# Patient Record
Sex: FEMALE | Race: WHITE | Employment: UNEMPLOYED | URBAN - METROPOLITAN AREA
[De-identification: names, ages, dates, MRNs, and addresses within clinical notes are randomized per-mention and may not be internally consistent; named-entity substitution may affect disease eponyms.]

---

## 2021-06-19 ENCOUNTER — APPOINTMENT (OUTPATIENT)
Dept: CT IMAGING | Age: 51
End: 2021-06-19
Payer: COMMERCIAL

## 2021-06-19 ENCOUNTER — HOSPITAL ENCOUNTER (EMERGENCY)
Age: 51
Discharge: ANOTHER HOSPITAL | End: 2021-06-19
Attending: EMERGENCY MEDICINE
Payer: COMMERCIAL

## 2021-06-19 ENCOUNTER — HOSPITAL ENCOUNTER (INPATIENT)
Age: 51
LOS: 6 days | Discharge: HOME | DRG: 392 | End: 2021-06-25
Attending: INTERNAL MEDICINE | Admitting: INTERNAL MEDICINE
Payer: COMMERCIAL

## 2021-06-19 VITALS
HEART RATE: 63 BPM | OXYGEN SATURATION: 99 % | SYSTOLIC BLOOD PRESSURE: 134 MMHG | HEIGHT: 61 IN | BODY MASS INDEX: 33.04 KG/M2 | RESPIRATION RATE: 16 BRPM | DIASTOLIC BLOOD PRESSURE: 60 MMHG | TEMPERATURE: 97.7 F | WEIGHT: 175 LBS

## 2021-06-19 DIAGNOSIS — K57.20 DIVERTICULITIS OF LARGE INTESTINE WITH PERFORATION AND ABSCESS WITHOUT BLEEDING: Primary | ICD-10-CM

## 2021-06-19 DIAGNOSIS — K57.92 DIVERTICULITIS: Primary | ICD-10-CM

## 2021-06-19 LAB
ALBUMIN SERPL-MCNC: 4.2 G/DL (ref 3.5–5.2)
ALP BLD-CCNC: 129 U/L (ref 35–104)
ALT SERPL-CCNC: 14 U/L (ref 0–32)
ANION GAP SERPL CALCULATED.3IONS-SCNC: 11 MMOL/L (ref 7–16)
AST SERPL-CCNC: 17 U/L (ref 0–31)
BACTERIA: ABNORMAL /HPF
BASOPHILS ABSOLUTE: 0.03 E9/L (ref 0–0.2)
BASOPHILS RELATIVE PERCENT: 0.4 % (ref 0–2)
BILIRUB SERPL-MCNC: 0.3 MG/DL (ref 0–1.2)
BILIRUBIN URINE: NEGATIVE
BLOOD, URINE: ABNORMAL
BUN BLDV-MCNC: 11 MG/DL (ref 6–20)
CALCIUM SERPL-MCNC: 9.5 MG/DL (ref 8.6–10.2)
CHLORIDE BLD-SCNC: 103 MMOL/L (ref 98–107)
CLARITY: CLEAR
CO2: 27 MMOL/L (ref 22–29)
COLOR: YELLOW
CREAT SERPL-MCNC: 0.9 MG/DL (ref 0.5–1)
EOSINOPHILS ABSOLUTE: 0.22 E9/L (ref 0.05–0.5)
EOSINOPHILS RELATIVE PERCENT: 2.9 % (ref 0–6)
GFR AFRICAN AMERICAN: >60
GFR NON-AFRICAN AMERICAN: >60 ML/MIN/1.73
GLUCOSE BLD-MCNC: 103 MG/DL (ref 74–99)
GLUCOSE URINE: NEGATIVE MG/DL
HCT VFR BLD CALC: 36.6 % (ref 34–48)
HEMOGLOBIN: 11.2 G/DL (ref 11.5–15.5)
IMMATURE GRANULOCYTES #: 0.03 E9/L
IMMATURE GRANULOCYTES %: 0.4 % (ref 0–5)
KETONES, URINE: NEGATIVE MG/DL
LACTIC ACID: 1.1 MMOL/L (ref 0.5–2.2)
LEUKOCYTE ESTERASE, URINE: NEGATIVE
LIPASE: 30 U/L (ref 13–60)
LYMPHOCYTES ABSOLUTE: 1.33 E9/L (ref 1.5–4)
LYMPHOCYTES RELATIVE PERCENT: 17.8 % (ref 20–42)
MCH RBC QN AUTO: 24.7 PG (ref 26–35)
MCHC RBC AUTO-ENTMCNC: 30.6 % (ref 32–34.5)
MCV RBC AUTO: 80.8 FL (ref 80–99.9)
MONOCYTES ABSOLUTE: 0.55 E9/L (ref 0.1–0.95)
MONOCYTES RELATIVE PERCENT: 7.4 % (ref 2–12)
NEUTROPHILS ABSOLUTE: 5.32 E9/L (ref 1.8–7.3)
NEUTROPHILS RELATIVE PERCENT: 71.1 % (ref 43–80)
NITRITE, URINE: NEGATIVE
PDW BLD-RTO: 16.4 FL (ref 11.5–15)
PH UA: 6 (ref 5–9)
PLATELET # BLD: 297 E9/L (ref 130–450)
PMV BLD AUTO: 10.3 FL (ref 7–12)
POTASSIUM REFLEX MAGNESIUM: 3.8 MMOL/L (ref 3.5–5)
PROTEIN UA: NEGATIVE MG/DL
RBC # BLD: 4.53 E12/L (ref 3.5–5.5)
RBC UA: ABNORMAL /HPF (ref 0–2)
SODIUM BLD-SCNC: 141 MMOL/L (ref 132–146)
SPECIFIC GRAVITY UA: 1.01 (ref 1–1.03)
TOTAL PROTEIN: 7.3 G/DL (ref 6.4–8.3)
TROPONIN, HIGH SENSITIVITY: 7 NG/L (ref 0–9)
UROBILINOGEN, URINE: 0.2 E.U./DL
WBC # BLD: 7.5 E9/L (ref 4.5–11.5)
WBC UA: ABNORMAL /HPF (ref 0–5)

## 2021-06-19 PROCEDURE — 6360000004 HC RX CONTRAST MEDICATION: Performed by: RADIOLOGY

## 2021-06-19 PROCEDURE — 36415 COLL VENOUS BLD VENIPUNCTURE: CPT

## 2021-06-19 PROCEDURE — 74177 CT ABD & PELVIS W/CONTRAST: CPT

## 2021-06-19 PROCEDURE — 83690 ASSAY OF LIPASE: CPT

## 2021-06-19 PROCEDURE — 2580000003 HC RX 258: Performed by: RADIOLOGY

## 2021-06-19 PROCEDURE — 96367 TX/PROPH/DG ADDL SEQ IV INF: CPT

## 2021-06-19 PROCEDURE — 1200000000 HC SEMI PRIVATE

## 2021-06-19 PROCEDURE — 87040 BLOOD CULTURE FOR BACTERIA: CPT

## 2021-06-19 PROCEDURE — 84484 ASSAY OF TROPONIN QUANT: CPT

## 2021-06-19 PROCEDURE — 83605 ASSAY OF LACTIC ACID: CPT

## 2021-06-19 PROCEDURE — 99223 1ST HOSP IP/OBS HIGH 75: CPT | Performed by: INTERNAL MEDICINE

## 2021-06-19 PROCEDURE — 6360000002 HC RX W HCPCS: Performed by: NURSE PRACTITIONER

## 2021-06-19 PROCEDURE — 93005 ELECTROCARDIOGRAM TRACING: CPT | Performed by: NURSE PRACTITIONER

## 2021-06-19 PROCEDURE — 6360000002 HC RX W HCPCS: Performed by: EMERGENCY MEDICINE

## 2021-06-19 PROCEDURE — 85025 COMPLETE CBC W/AUTO DIFF WBC: CPT

## 2021-06-19 PROCEDURE — 96376 TX/PRO/DX INJ SAME DRUG ADON: CPT

## 2021-06-19 PROCEDURE — 80053 COMPREHEN METABOLIC PANEL: CPT

## 2021-06-19 PROCEDURE — 96365 THER/PROPH/DIAG IV INF INIT: CPT

## 2021-06-19 PROCEDURE — 2500000003 HC RX 250 WO HCPCS: Performed by: NURSE PRACTITIONER

## 2021-06-19 PROCEDURE — 99284 EMERGENCY DEPT VISIT MOD MDM: CPT

## 2021-06-19 PROCEDURE — 96375 TX/PRO/DX INJ NEW DRUG ADDON: CPT

## 2021-06-19 PROCEDURE — 81001 URINALYSIS AUTO W/SCOPE: CPT

## 2021-06-19 PROCEDURE — 2580000003 HC RX 258: Performed by: NURSE PRACTITIONER

## 2021-06-19 RX ORDER — METOPROLOL SUCCINATE 25 MG/1
25 TABLET, EXTENDED RELEASE ORAL NIGHTLY
COMMUNITY

## 2021-06-19 RX ORDER — MORPHINE SULFATE 4 MG/ML
4 INJECTION, SOLUTION INTRAMUSCULAR; INTRAVENOUS ONCE
Status: COMPLETED | OUTPATIENT
Start: 2021-06-19 | End: 2021-06-19

## 2021-06-19 RX ORDER — 0.9 % SODIUM CHLORIDE 0.9 %
1000 INTRAVENOUS SOLUTION INTRAVENOUS ONCE
Status: COMPLETED | OUTPATIENT
Start: 2021-06-19 | End: 2021-06-19

## 2021-06-19 RX ORDER — CIPROFLOXACIN 2 MG/ML
400 INJECTION, SOLUTION INTRAVENOUS ONCE
Status: COMPLETED | OUTPATIENT
Start: 2021-06-19 | End: 2021-06-19

## 2021-06-19 RX ORDER — SODIUM CHLORIDE 0.9 % (FLUSH) 0.9 %
10 SYRINGE (ML) INJECTION PRN
Status: COMPLETED | OUTPATIENT
Start: 2021-06-19 | End: 2021-06-19

## 2021-06-19 RX ORDER — ONDANSETRON 2 MG/ML
4 INJECTION INTRAMUSCULAR; INTRAVENOUS ONCE
Status: COMPLETED | OUTPATIENT
Start: 2021-06-19 | End: 2021-06-19

## 2021-06-19 RX ORDER — ESOMEPRAZOLE MAGNESIUM 20 MG/1
20 FOR SUSPENSION ORAL DAILY PRN
COMMUNITY

## 2021-06-19 RX ADMIN — METRONIDAZOLE 500 MG: 500 INJECTION, SOLUTION INTRAVENOUS at 21:24

## 2021-06-19 RX ADMIN — MORPHINE SULFATE 4 MG: 4 INJECTION, SOLUTION INTRAMUSCULAR; INTRAVENOUS at 21:30

## 2021-06-19 RX ADMIN — ONDANSETRON 4 MG: 2 INJECTION INTRAMUSCULAR; INTRAVENOUS at 17:36

## 2021-06-19 RX ADMIN — IOPAMIDOL 75 ML: 755 INJECTION, SOLUTION INTRAVENOUS at 19:03

## 2021-06-19 RX ADMIN — SODIUM CHLORIDE 1000 ML: 9 INJECTION, SOLUTION INTRAVENOUS at 17:36

## 2021-06-19 RX ADMIN — MORPHINE SULFATE 4 MG: 4 INJECTION, SOLUTION INTRAMUSCULAR; INTRAVENOUS at 17:45

## 2021-06-19 RX ADMIN — SODIUM CHLORIDE, PRESERVATIVE FREE 10 ML: 5 INJECTION INTRAVENOUS at 19:03

## 2021-06-19 RX ADMIN — CIPROFLOXACIN 400 MG: 2 INJECTION, SOLUTION INTRAVENOUS at 20:08

## 2021-06-19 ASSESSMENT — PAIN DESCRIPTION - LOCATION
LOCATION: ABDOMEN
LOCATION: ABDOMEN

## 2021-06-19 ASSESSMENT — PAIN DESCRIPTION - PROGRESSION: CLINICAL_PROGRESSION: GRADUALLY WORSENING

## 2021-06-19 ASSESSMENT — PAIN SCALES - GENERAL
PAINLEVEL_OUTOF10: 10
PAINLEVEL_OUTOF10: 2
PAINLEVEL_OUTOF10: 10

## 2021-06-19 ASSESSMENT — PAIN DESCRIPTION - PAIN TYPE
TYPE: ACUTE PAIN
TYPE: ACUTE PAIN

## 2021-06-19 ASSESSMENT — PAIN DESCRIPTION - DESCRIPTORS
DESCRIPTORS: ACHING
DESCRIPTORS: ACHING;DULL

## 2021-06-19 ASSESSMENT — PAIN DESCRIPTION - ONSET: ONSET: ON-GOING

## 2021-06-19 ASSESSMENT — PAIN DESCRIPTION - FREQUENCY: FREQUENCY: CONTINUOUS

## 2021-06-19 NOTE — ED NOTES
Resting on cot without signs of distress. Denies pain at this time.       Henrry Montelongo RN  06/19/21 0144

## 2021-06-20 LAB
EKG ATRIAL RATE: 67 BPM
EKG P AXIS: 21 DEGREES
EKG P-R INTERVAL: 120 MS
EKG Q-T INTERVAL: 402 MS
EKG QRS DURATION: 86 MS
EKG QTC CALCULATION (BAZETT): 424 MS
EKG R AXIS: 19 DEGREES
EKG T AXIS: 61 DEGREES
EKG VENTRICULAR RATE: 67 BPM
FERRITIN: 21 NG/ML
IRON SATURATION: 8 % (ref 15–50)
IRON: 27 MCG/DL (ref 37–145)
TOTAL IRON BINDING CAPACITY: 336 MCG/DL (ref 250–450)

## 2021-06-20 PROCEDURE — 1200000000 HC SEMI PRIVATE

## 2021-06-20 PROCEDURE — 83540 ASSAY OF IRON: CPT

## 2021-06-20 PROCEDURE — 6360000002 HC RX W HCPCS: Performed by: INTERNAL MEDICINE

## 2021-06-20 PROCEDURE — 83550 IRON BINDING TEST: CPT

## 2021-06-20 PROCEDURE — 36415 COLL VENOUS BLD VENIPUNCTURE: CPT

## 2021-06-20 PROCEDURE — 6370000000 HC RX 637 (ALT 250 FOR IP): Performed by: INTERNAL MEDICINE

## 2021-06-20 PROCEDURE — 93010 ELECTROCARDIOGRAM REPORT: CPT | Performed by: INTERNAL MEDICINE

## 2021-06-20 PROCEDURE — 99233 SBSQ HOSP IP/OBS HIGH 50: CPT | Performed by: INTERNAL MEDICINE

## 2021-06-20 PROCEDURE — 2500000003 HC RX 250 WO HCPCS: Performed by: INTERNAL MEDICINE

## 2021-06-20 PROCEDURE — 2580000003 HC RX 258: Performed by: INTERNAL MEDICINE

## 2021-06-20 PROCEDURE — 82728 ASSAY OF FERRITIN: CPT

## 2021-06-20 RX ORDER — SODIUM CHLORIDE 9 MG/ML
25 INJECTION, SOLUTION INTRAVENOUS PRN
Status: DISCONTINUED | OUTPATIENT
Start: 2021-06-20 | End: 2021-06-25 | Stop reason: HOSPADM

## 2021-06-20 RX ORDER — LEVOTHYROXINE SODIUM 0.12 MG/1
125 TABLET ORAL DAILY
Status: DISCONTINUED | OUTPATIENT
Start: 2021-06-20 | End: 2021-06-25 | Stop reason: HOSPADM

## 2021-06-20 RX ORDER — ACETAMINOPHEN 650 MG/1
650 SUPPOSITORY RECTAL EVERY 6 HOURS PRN
Status: DISCONTINUED | OUTPATIENT
Start: 2021-06-20 | End: 2021-06-25 | Stop reason: HOSPADM

## 2021-06-20 RX ORDER — SODIUM CHLORIDE, SODIUM LACTATE, POTASSIUM CHLORIDE, CALCIUM CHLORIDE 600; 310; 30; 20 MG/100ML; MG/100ML; MG/100ML; MG/100ML
INJECTION, SOLUTION INTRAVENOUS CONTINUOUS
Status: DISCONTINUED | OUTPATIENT
Start: 2021-06-20 | End: 2021-06-21

## 2021-06-20 RX ORDER — SODIUM CHLORIDE 0.9 % (FLUSH) 0.9 %
10 SYRINGE (ML) INJECTION EVERY 12 HOURS SCHEDULED
Status: DISCONTINUED | OUTPATIENT
Start: 2021-06-20 | End: 2021-06-25 | Stop reason: HOSPADM

## 2021-06-20 RX ORDER — MORPHINE SULFATE 2 MG/ML
2 INJECTION, SOLUTION INTRAMUSCULAR; INTRAVENOUS EVERY 4 HOURS PRN
Status: DISCONTINUED | OUTPATIENT
Start: 2021-06-20 | End: 2021-06-22

## 2021-06-20 RX ORDER — PANTOPRAZOLE SODIUM 40 MG/1
40 TABLET, DELAYED RELEASE ORAL
Status: DISCONTINUED | OUTPATIENT
Start: 2021-06-20 | End: 2021-06-25 | Stop reason: HOSPADM

## 2021-06-20 RX ORDER — CIPROFLOXACIN 2 MG/ML
400 INJECTION, SOLUTION INTRAVENOUS EVERY 12 HOURS
Status: DISCONTINUED | OUTPATIENT
Start: 2021-06-20 | End: 2021-06-20

## 2021-06-20 RX ORDER — METOPROLOL SUCCINATE 25 MG/1
25 TABLET, EXTENDED RELEASE ORAL NIGHTLY
Status: DISCONTINUED | OUTPATIENT
Start: 2021-06-20 | End: 2021-06-25 | Stop reason: HOSPADM

## 2021-06-20 RX ORDER — ESOMEPRAZOLE MAGNESIUM 20 MG/1
20 FOR SUSPENSION ORAL DAILY
Status: DISCONTINUED | OUTPATIENT
Start: 2021-06-20 | End: 2021-06-20 | Stop reason: CLARIF

## 2021-06-20 RX ORDER — ACETAMINOPHEN 325 MG/1
650 TABLET ORAL EVERY 6 HOURS PRN
Status: DISCONTINUED | OUTPATIENT
Start: 2021-06-20 | End: 2021-06-25 | Stop reason: HOSPADM

## 2021-06-20 RX ORDER — SODIUM CHLORIDE 0.9 % (FLUSH) 0.9 %
10 SYRINGE (ML) INJECTION PRN
Status: DISCONTINUED | OUTPATIENT
Start: 2021-06-20 | End: 2021-06-25 | Stop reason: HOSPADM

## 2021-06-20 RX ORDER — POLYETHYLENE GLYCOL 3350 17 G/17G
17 POWDER, FOR SOLUTION ORAL DAILY PRN
Status: DISCONTINUED | OUTPATIENT
Start: 2021-06-20 | End: 2021-06-25 | Stop reason: HOSPADM

## 2021-06-20 RX ORDER — LEVOFLOXACIN 5 MG/ML
750 INJECTION, SOLUTION INTRAVENOUS EVERY 24 HOURS
Status: DISCONTINUED | OUTPATIENT
Start: 2021-06-20 | End: 2021-06-25 | Stop reason: HOSPADM

## 2021-06-20 RX ADMIN — PANTOPRAZOLE SODIUM 40 MG: 40 TABLET, DELAYED RELEASE ORAL at 07:24

## 2021-06-20 RX ADMIN — METRONIDAZOLE 500 MG: 500 INJECTION, SOLUTION INTRAVENOUS at 08:37

## 2021-06-20 RX ADMIN — MORPHINE SULFATE 2 MG: 2 INJECTION, SOLUTION INTRAMUSCULAR; INTRAVENOUS at 23:13

## 2021-06-20 RX ADMIN — LEVOFLOXACIN 750 MG: 5 INJECTION, SOLUTION INTRAVENOUS at 10:19

## 2021-06-20 RX ADMIN — SODIUM CHLORIDE, POTASSIUM CHLORIDE, SODIUM LACTATE AND CALCIUM CHLORIDE 100 ML/HR: 600; 310; 30; 20 INJECTION, SOLUTION INTRAVENOUS at 07:27

## 2021-06-20 RX ADMIN — ACETAMINOPHEN 650 MG: 325 TABLET ORAL at 08:47

## 2021-06-20 RX ADMIN — SODIUM CHLORIDE, PRESERVATIVE FREE 10 ML: 5 INJECTION INTRAVENOUS at 07:27

## 2021-06-20 RX ADMIN — METRONIDAZOLE 500 MG: 500 INJECTION, SOLUTION INTRAVENOUS at 15:56

## 2021-06-20 RX ADMIN — LEVOTHYROXINE SODIUM 125 MCG: 125 TABLET ORAL at 07:24

## 2021-06-20 RX ADMIN — MORPHINE SULFATE 2 MG: 2 INJECTION, SOLUTION INTRAMUSCULAR; INTRAVENOUS at 18:50

## 2021-06-20 RX ADMIN — METRONIDAZOLE 500 MG: 500 INJECTION, SOLUTION INTRAVENOUS at 23:13

## 2021-06-20 RX ADMIN — MORPHINE SULFATE 2 MG: 2 INJECTION, SOLUTION INTRAMUSCULAR; INTRAVENOUS at 08:38

## 2021-06-20 RX ADMIN — METOPROLOL SUCCINATE 25 MG: 25 TABLET, EXTENDED RELEASE ORAL at 21:20

## 2021-06-20 RX ADMIN — METOPROLOL SUCCINATE 25 MG: 25 TABLET, EXTENDED RELEASE ORAL at 00:38

## 2021-06-20 ASSESSMENT — PAIN DESCRIPTION - PAIN TYPE
TYPE: ACUTE PAIN

## 2021-06-20 ASSESSMENT — PAIN DESCRIPTION - ORIENTATION: ORIENTATION: LEFT

## 2021-06-20 ASSESSMENT — PAIN SCALES - GENERAL
PAINLEVEL_OUTOF10: 7
PAINLEVEL_OUTOF10: 7
PAINLEVEL_OUTOF10: 4
PAINLEVEL_OUTOF10: 2
PAINLEVEL_OUTOF10: 7

## 2021-06-20 ASSESSMENT — PAIN - FUNCTIONAL ASSESSMENT
PAIN_FUNCTIONAL_ASSESSMENT: ACTIVITIES ARE NOT PREVENTED

## 2021-06-20 ASSESSMENT — PAIN DESCRIPTION - LOCATION
LOCATION: HEAD
LOCATION: ABDOMEN
LOCATION: ABDOMEN

## 2021-06-20 ASSESSMENT — PAIN DESCRIPTION - PROGRESSION
CLINICAL_PROGRESSION: NOT CHANGED

## 2021-06-20 ASSESSMENT — PAIN DESCRIPTION - DESCRIPTORS
DESCRIPTORS: CONSTANT;ACHING;DISCOMFORT
DESCRIPTORS: CONSTANT;ACHING;DISCOMFORT
DESCRIPTORS: HEADACHE

## 2021-06-20 ASSESSMENT — PAIN DESCRIPTION - FREQUENCY
FREQUENCY: CONTINUOUS

## 2021-06-20 ASSESSMENT — PAIN DESCRIPTION - ONSET
ONSET: ON-GOING

## 2021-06-20 NOTE — PROGRESS NOTES
[facility] CARE AT [facility] Hospitalist   Progress Note    Admitting Date and Time: 6/19/2021 10:37 PM  Admit Dx: Acute diverticulitis [K57.92]    Subjective:    Patient was admitted with Acute diverticulitis [K57.92]. Patient denies fever, chills, cp, sob, n/v.     levothyroxine  125 mcg Oral Daily    metoprolol succinate  25 mg Oral Nightly    pantoprazole  40 mg Oral QAM AC    metroNIDAZOLE  500 mg Intravenous Q8H    sodium chloride flush  10 mL Intravenous 2 times per day    levofloxacin  750 mg Intravenous Q24H     sodium chloride flush, 10 mL, PRN  sodium chloride, 25 mL, PRN  polyethylene glycol, 17 g, Daily PRN  acetaminophen, 650 mg, Q6H PRN   Or  acetaminophen, 650 mg, Q6H PRN  morphine, 2 mg, Q4H PRN         Objective:          PHYSICAL EXAM:    Vitals:  /69   Pulse 63   Temp 98.4 °F (36.9 °C) (Oral)   Resp 16   Ht 5' 1\" (1.549 m)   Wt 178 lb 14.4 oz (81.1 kg)   LMP 02/06/2015   SpO2 94%   BMI 33.80 kg/m²     General:  Appears comfortable. Answers questions appropriately and cooperative with exam  HEENT:  Mucous membranes moist. No erythema, rhinorrhea, or post-nasal drip noted. Neck:  No carotid bruits. Heart:  Rhythm regular at rate of 64  Lungs:  CTA. No wheeze, rales, or rhonchi  Abdomen:  Positive bowel sounds positive. Soft. Non-tender. No guarding, rebound or rigidity. Breast/Rectal/Genitourinary: not pertinent. Extremities:  Negative for lower extremity edema  Skin:  Warm and dry  Vascular: 2/4 Dorsalis Pedis pulses bilaterally. Neuro:  Cranial nerves 2-12 grossly intact, no focal weakness or change in sensation noted. Extraocular muscles intact. Pupils equal, round, reactive to light.             Recent Labs     06/19/21  1728      K 3.8      CO2 27   BUN 11   CREATININE 0.9   GLUCOSE 103*   CALCIUM 9.5       Recent Labs     06/19/21  1728   WBC 7.5   RBC 4.53   HGB 11.2*   HCT 36.6   MCV 80.8   MCH 24.7*   MCHC 30.6*   RDW 16.4*      MPV 10.3 Radiology:   IR Interventional Radiology Procedure Request    (Results Pending)       Assessment:    Active Problems:    Acute diverticulitis  Resolved Problems:    * No resolved hospital problems. *      Plan:  1. Diverticulitis with perforation and abscess  Surgery following  continue abx  pt for IR to drain abscess  2.  Hypothyroidism continue med  3. htn continue med  4. gerd continue med    Chart reviewed and updated by nursing    Time spent is 35 min    Electronically signed by Freddie Siegel DO on 6/20/2021 at 6:48 PM

## 2021-06-20 NOTE — ED PROVIDER NOTES
ED Attending  CC: No       [address] [facility]  Department of Emergency Medicine   ED  Encounter Note  Admit Date/RoomTime: 2021  4:22 PM  ED Room:     NAME: Chris Lay  : 1970  MRN: [id number]     Chief Complaint:  Abdominal Pain (severe pain-started on thursday and progressively worse)    History of Present Illness        Chris Lay is a 48 y.o. old female who presents to the emergency department by private vehicle for sudden onset aching, cramping, sharp pain in the LLQ without radiation which began several week(s) prior to arrival.   There has been no similar episodes in the past .  Since onset the symptoms have been intermittent. The pain is associated with nausea. The pain is aggravated by certain movements and relieved by nothing. There has been NO back pain, chills, cloudy urine or constipation. .  ROS   Pertinent positives and negatives are stated within HPI, all other systems reviewed and are negative. Past Medical History:  has a past medical history of GERD (gastroesophageal reflux disease) and Thyroid disease. Surgical History has a past surgical history that includes Breast surgery and  section. Social History:  reports that she has never smoked. She has never used smokeless tobacco. She reports current alcohol use. She reports that she does not use drugs. Family History: family history is not on file. Allergies: Pcn [penicillins]    Physical Exam   Oxygen Saturation Interpretation: Normal.        ED Triage Vitals   BP Temp Temp Source Pulse Resp SpO2 Height Weight   21 1620 21 1620 21 1620 21 1620 21 1620 21 1620 21 1628 21 1628   125/83 97.7 °F (36.5 °C) Temporal 65 16 97 % 5' 1\" (1.549 m) 175 lb (79.4 kg)         General Appearance/Constitutional:  Alert, development consistent with age  [de-identified]:  NC/NT. PERRLA. Airway patent. Neck:  Supple. No lymphadenopathy.   Respiratory: 0.4 0.0 - 5.0 %    Lymphocytes % 17.8 (L) 20.0 - 42.0 %    Monocytes % 7.4 2.0 - 12.0 %    Eosinophils % 2.9 0.0 - 6.0 %    Basophils % 0.4 0.0 - 2.0 %    Neutrophils Absolute 5.32 1.80 - 7.30 E9/L    Immature Granulocytes # 0.03 E9/L    Lymphocytes Absolute 1.33 (L) 1.50 - 4.00 E9/L    Monocytes Absolute 0.55 0.10 - 0.95 E9/L    Eosinophils Absolute 0.22 0.05 - 0.50 E9/L    Basophils Absolute 0.03 0.00 - 0.20 E9/L   Comprehensive Metabolic Panel w/ Reflex to MG   Result Value Ref Range    Sodium 141 132 - 146 mmol/L    Potassium reflex Magnesium 3.8 3.5 - 5.0 mmol/L    Chloride 103 98 - 107 mmol/L    CO2 27 22 - 29 mmol/L    Anion Gap 11 7 - 16 mmol/L    Glucose 103 (H) 74 - 99 mg/dL    BUN 11 6 - 20 mg/dL    CREATININE 0.9 0.5 - 1.0 mg/dL    GFR Non-African American >60 >=60 mL/min/1.73    GFR African American >60     Calcium 9.5 8.6 - 10.2 mg/dL    Total Protein 7.3 6.4 - 8.3 g/dL    Albumin 4.2 3.5 - 5.2 g/dL    Total Bilirubin 0.3 0.0 - 1.2 mg/dL    Alkaline Phosphatase 129 (H) 35 - 104 U/L    ALT 14 0 - 32 U/L    AST 17 0 - 31 U/L   Lactic Acid, Plasma   Result Value Ref Range    Lactic Acid 1.1 0.5 - 2.2 mmol/L   Lipase   Result Value Ref Range    Lipase 30 13 - 60 U/L   Troponin   Result Value Ref Range    Troponin, High Sensitivity 7 0 - 9 ng/L   EKG 12 Lead   Result Value Ref Range    Ventricular Rate 67 BPM    Atrial Rate 67 BPM    P-R Interval 120 ms    QRS Duration 86 ms    Q-T Interval 402 ms    QTc Calculation (Bazett) 424 ms    P Axis 21 degrees    R Axis 19 degrees    T Axis 61 degrees     Imaging: All Radiology results interpreted by Radiologist unless otherwise noted. CT ABDOMEN PELVIS W IV CONTRAST Additional Contrast? None   Final Result   1. Diverticulitis involving left colon with adjacent loculated fluid and gas   collection suggesting contained perforation or diverticular abscess. 2.  No free air or free fluid.       3.  Cystic lesion associated with the left ovary measures 2.8 x 3.2 cm.   Ultrasound follow-up recommended for further evaluation. 4.  Multiple nonobstructing bilateral renal calculi. 5.  Hypoattenuating lesions associated with the liver likely represent cysts. Ultrasound follow-up could be helpful for further evaluation. RECOMMENDATIONS:   Managing Incidental Adnexal Cystic Mass by CT or MR      Benign cyst:      Premenopausal (< or equal to 50 years if LMP unknown)      < or equal to 5 cm: no follow up      >5 cm: US in 6-12 weeks      > or equal to 10 cm: US promptly      Early postmenopausal (0-5 years after LMP)      < or equal to 3 cm: no follow up      3-5 cm: US in 6-12 weeks      >5 cm: US promptly      Late postmenopausal      < or equal to 3 cm: no follow up      >3 cm: US promptly      Probably benign cyst : {benign appearing except demonstrates one or more of   the following - (a) angulated margin, (b) not round/oval shape, (c) not well   imaged due to artifact or technical parameters (ex. noncontrast CT)      Premenopausal (< than or equal to 50 years if LMP unknown)      < or equal to 3 cm: no follow up      3-5 cm: US in 6-12 weeks      >5 cm: US      Early postmenopausal      < or equal to 3 cm: no follow up      >3 cm: US promptly      Late postmenopausal      < or equal to 1 cm: no follow up      >1 cm: US      Reference:      Teressa Gutierrez et al. Managing Incidental Findings on Abdominal CT: White Paper of   the ACR Incidental Findings Committee.  J Am Parish Radiol 2010;7:754-773           EKG Interpretation    Interpreted by emergency department physician    Rhythm: normal sinus   Rate: normal  Axis: normal  Ectopy: none  Conduction: normal  ST Segments: no acute change  T Waves: no acute change  Q Waves: none    Clinical Impression: no acute changes    MIRANDA Bustamante - CNP    ED Course / Medical Decision Making     Medications   metronidazole (FLAGYL) 500 mg in NaCl 100 mL IVPB premix (has no administration in time range)   ciprofloxacin (CIPRO) IVPB 400 mg (400 mg Intravenous New Bag 6/19/21 2008)   0.9 % sodium chloride bolus (0 mLs Intravenous Stopped 6/19/21 1854)   ondansetron (ZOFRAN) injection 4 mg (4 mg Intravenous Given 6/19/21 1736)   morphine sulfate (PF) injection 4 mg (4 mg Intravenous Given 6/19/21 1745)   iopamidol (ISOVUE-370) 76 % injection 75 mL (75 mLs Intravenous Given 6/19/21 1903)   sodium chloride flush 0.9 % injection 10 mL (10 mLs Intravenous Given 6/19/21 1903)     ED Course as of Jun 19 2023   Sat Jun 19, 8177   3580 Dr Ash Holley, radiologist -patient has diverticulitis with abscess vs contained perferation  Also an atypical left ovarian cyst - ultrasound. [MF]      ED Course User Index  [MF] Ranae Landau, DO     Re-Evaluations:  6/19/21      Time: 5216  Patients condition is improving. Consultations:             IP CONSULT TO HOSPITALIST   [id number] spoke with . will accept the patient to [facility]  Procedures:   none    MDM:  The patient was treated with iv fluids ,pain medication and iv antibiotics she is agreeable for inpatient admission for her diverticulitis and possible abscess. Patient was seen and evaluated by dr. Tico Pandya and Marilia nowak is accepting    Plan of Care/Counseling:  EM Attending Physician and I reviewed today's visit with the patient and spouse / life partner in addition to providing specific details for the plan of care and counseling regarding the diagnosis and prognosis. Questions are answered at this time and are agreeable with the plan. Assessment      1. Diverticulitis      This patient's ED course included: a personal history and physicial examination  This patient has improved during their ED course. Plan   [facility]patient Ad[facility] with telemetry  Patient condition is stable.     New Medications     New Prescriptions    No medications on file     Electronically signed by MIRANDA Abrams CNP   DD: 6/19/21  **This report was transcribed using voice recognition software. Every effort was made to ensure accuracy; however, inadvertent computerized transcription errors may be present.   Anya 79, APRN - CNP  06/19/21 2023

## 2021-06-20 NOTE — CONSULTS
Consultation    Patient's Name/Date of Birth: Rodger Blanchard / 1970    Date: 2021     PCP: Jenny Pike DO     Reason for consult:    Acute diverticulitis with perforation small abscess      History of Present Illness: The patient is a 72-year-old white female who presents with a perforated but contained diverticular abscess. The patient states that she developed pain in the left lower quadrant on Thursday. She states that the pain has worsened. She denies any other significant associated symptomatology including fevers and chills. She states that the pain is focused to the left side of the abdomen. The patient denies any other significant associated symptomatology.       Past Medical History:   Diagnosis Date    GERD (gastroesophageal reflux disease)     Hypertension     Thyroid disease       Past Surgical History:   Procedure Laterality Date    BREAST SURGERY       SECTION        Allergies: Pcn [penicillins]     Current Facility-Administered Medications   Medication Dose Route Frequency Provider Last Rate Last Admin    levothyroxine (SYNTHROID) tablet 125 mcg  125 mcg Oral Daily Farnaz Guillaume MD   125 mcg at 21 0724    metoprolol succinate (TOPROL XL) extended release tablet 25 mg  25 mg Oral Nightly Farnaz Guillaume MD   25 mg at 21 0038    pantoprazole (PROTONIX) tablet 40 mg  40 mg Oral QAM AC Farnaz Guillaume MD   40 mg at 21 0724    metronidazole (FLAGYL) 500 mg in NaCl 100 mL IVPB premix  500 mg Intravenous Ángela Patterson MD   Stopped at 21 0937    sodium chloride flush 0.9 % injection 10 mL  10 mL Intravenous 2 times per day Bruna Milner MD        sodium chloride flush 0.9 % injection 10 mL  10 mL Intravenous PRN Farnaz Guillaume MD   10 mL at 21 0727    0.9 % sodium chloride infusion  25 mL Intravenous PRN Farnaz Guillaume MD        polyethylene glycol (GLYCOLAX) packet 17 g  17 g Oral Daily PRN Bruna Milner MD       Floibis Ada acetaminophen (TYLENOL) tablet 650 mg  650 mg Oral Q6H PRN Ayaan Wood MD   650 mg at 06/20/21 0847    Or    acetaminophen (TYLENOL) suppository 650 mg  650 mg Rectal Q6H PRN Ayaan Wood MD        lactated ringers infusion   Intravenous Continuous Farnaz Guillaume  mL/hr at 06/20/21 0727 100 mL/hr at 06/20/21 0727    morphine (PF) injection 2 mg  2 mg Intravenous Q4H PRN Farnaz Guillaume MD   2 mg at 06/20/21 0838    levoFLOXacin (LEVAQUIN) 750 MG/150ML infusion 750 mg  750 mg Intravenous Q24H Jose Hric,  mL/hr at 06/20/21 1019 750 mg at 06/20/21 1019       Social History     Tobacco Use    Smoking status: Never Smoker    Smokeless tobacco: Never Used   Substance Use Topics    Alcohol use: Yes        Labs:  Lab Results   Component Value Date    WBC 7.5 06/19/2021    HCT 36.6 06/19/2021    HGB 11.2 (L) 06/19/2021     06/19/2021      Lab Results   Component Value Date     06/19/2021    K 3.8 06/19/2021     06/19/2021    CO2 27 06/19/2021    BUN 11 06/19/2021    CREATININE 0.9 06/19/2021    GLUCOSE 103 (H) 06/19/2021     Lab Results   Component Value Date    AST 17 06/19/2021    ALT 14 06/19/2021    ALKPHOS 129 (H) 06/19/2021    PROT 7.3 06/19/2021    LIPASE 30 06/19/2021        Review of Systems:    Other than stated above in the HPI is negative      Physical exam: /69   Pulse 63   Temp 98.4 °F (36.9 °C) (Oral)   Resp 16   Ht 5' 1\" (1.549 m)   LMP 02/06/2015   SpO2 94%   BMI 33.07 kg/m²     General appearance: no acute distress  Head: NCAT, PERRLA, EOMI  Neck: supple, no masses  Lungs: CTABL  Heart: RRR  Abdomen: soft, nondistended, tender, normotympanic, no guarding, no peritoneal signs.   Extremities: no rash cyanosis edema or jaundice    Assessment:    Perforated diverticular disease with small abscess    Plan:    Interventional radiology to drain the abscess tomorrow  IV antibiotics  Karolina Ashton MD,MD 6/20/2021 at 11:06 AM

## 2021-06-20 NOTE — H&P
[facility] History and Physical      CHIEF COMPLAINT:  abd pain    History of Present Illness: 80-year-old female with a history of essential hypertension and hypothyroidism. Also told she has spasm of her gallbladder. On Thursday night she developed pain in the left lower quadrant of her abdomen that she states was sharp. Since then has worsened and radiated to her back. Associated with nausea. Had one episode of vomiting. Due to her nausea she has not been able to eat much but is tolerating clear liquid. No constipation or diarrhea. Went to an outside hospital.  Found to have diverticulitis with microperforation and a 2.5 cm abscess. Given antibiotics and referred to this hospital.  No prior episodes of diverticulitis. No prior colonoscopy. Mother with colon cancer diagnosed in her 76s. When seen she had received analgesics with improvement in her pain. Informant(s) for H&P:patient    REVIEW OF SYSTEMS:  A comprehensive 14 point review of systems was negative except for: what is in the HPI    PMH:  Past Medical History:   Diagnosis Date    GERD (gastroesophageal reflux disease)     Hypertension     Thyroid disease        Surgical History:  Past Surgical History:   Procedure Laterality Date    BREAST SURGERY       SECTION         Medications Prior to Admission:    Prior to Admission medications    Medication Sig Start Date End Date Taking? Authorizing Provider   metoprolol succinate (TOPROL XL) 25 MG extended release tablet Take 25 mg by mouth nightly    Yes Historical Provider, MD   esomeprazole Magnesium (NEXIUM) 20 MG PACK Take 20 mg by mouth daily as needed   Yes Historical Provider, MD   levothyroxine (SYNTHROID) 100 MCG tablet Take 125 mcg by mouth Daily    Yes Historical Provider, MD   Cetirizine HCl (ZYRTEC PO) Take 1 tablet by mouth daily as needed.    Yes Historical Provider, MD       Allergies:    Pcn [penicillins]    Social History:    reports that she has never smoked. She has never used smokeless tobacco. She reports current alcohol use. She reports that she does not use drugs. Family History:   Mom colon ca      PHYSICAL EXAM:  Vitals:  BP (!) 156/78   Pulse 64   Temp 97.9 °F (36.6 °C) (Oral)   Resp 16   LMP 02/06/2015   SpO2 97%   General Appearance: alert and oriented to person, place and time and in no acute distress  Skin: warm and dry, turgor not diminished  Head: normocephalic and atraumatic  Eyes: pupils equal, round, and reactive to light, extraocular eye movements intact, conjunctivae normal  Neck: neck supple and non tender without mass   Pulmonary/Chest: clear to auscultation bilaterally- no wheezes, rales or rhonchi, normal air movement, no respiratory distress  Cardiovascular: normal rate, normal S1 and S2 and no M/R/R  Abdomen: soft, LLQ ttp  Extremities: no cyanosis, no clubbing and no edema  Neurologic: no cranial nerve deficit and speech normal        LABS:  Recent Labs     06/19/21  1728      K 3.8      CO2 27   BUN 11   CREATININE 0.9   GLUCOSE 103*   CALCIUM 9.5       Recent Labs     06/19/21  1728   WBC 7.5   RBC 4.53   HGB 11.2*   HCT 36.6   MCV 80.8   MCH 24.7*   MCHC 30.6*   RDW 16.4*      MPV 10.3       No results for input(s): POCGLU in the last 72 hours. Radiology:   No orders to display       EKG: No acute moderate    ASSESSMENT:    Acute diverticulitis complicated by microperforation and abscess  Essential hypertension  Hypothyroidism  Microcytic anemia  Ovarian cyst  Nonobstructing nephrolithiasis    PLAN:  Acute diverticulitis complicated by microperforation and abscess:  -Cipro and Flagyl  N. p.o., except sips of liquid  Consult surgery  Blood culture pending    Essential hypertension: Resume Toprol    Hypothyroidism: resume synthroid    Code Status: full  DVT prophylaxis: heparin      NOTE: This report was transcribed using voice recognition software.  Every effort was made to ensure accuracy; however, inadvertent computerized transcription errors may be present.   Electronically signed by Faheem Chavira MD on 6/19/2021 at 11:49 PM

## 2021-06-21 LAB
ANION GAP SERPL CALCULATED.3IONS-SCNC: 8 MMOL/L (ref 7–16)
APTT: 30 SEC (ref 24.5–35.1)
BASOPHILS ABSOLUTE: 0.03 E9/L (ref 0–0.2)
BASOPHILS RELATIVE PERCENT: 0.5 % (ref 0–2)
BUN BLDV-MCNC: 10 MG/DL (ref 6–20)
CALCIUM SERPL-MCNC: 8.9 MG/DL (ref 8.6–10.2)
CHLORIDE BLD-SCNC: 107 MMOL/L (ref 98–107)
CO2: 28 MMOL/L (ref 22–29)
CREAT SERPL-MCNC: 0.8 MG/DL (ref 0.5–1)
EOSINOPHILS ABSOLUTE: 0.23 E9/L (ref 0.05–0.5)
EOSINOPHILS RELATIVE PERCENT: 4.1 % (ref 0–6)
GFR AFRICAN AMERICAN: >60
GFR NON-AFRICAN AMERICAN: >60 ML/MIN/1.73
GLUCOSE BLD-MCNC: 101 MG/DL (ref 74–99)
HCT VFR BLD CALC: 32.1 % (ref 34–48)
HEMOGLOBIN: 9.8 G/DL (ref 11.5–15.5)
IMMATURE GRANULOCYTES #: 0.01 E9/L
IMMATURE GRANULOCYTES %: 0.2 % (ref 0–5)
INR BLD: 1.4
LYMPHOCYTES ABSOLUTE: 1.08 E9/L (ref 1.5–4)
LYMPHOCYTES RELATIVE PERCENT: 19.3 % (ref 20–42)
MCH RBC QN AUTO: 25.1 PG (ref 26–35)
MCHC RBC AUTO-ENTMCNC: 30.5 % (ref 32–34.5)
MCV RBC AUTO: 82.1 FL (ref 80–99.9)
MONOCYTES ABSOLUTE: 0.46 E9/L (ref 0.1–0.95)
MONOCYTES RELATIVE PERCENT: 8.2 % (ref 2–12)
NEUTROPHILS ABSOLUTE: 3.78 E9/L (ref 1.8–7.3)
NEUTROPHILS RELATIVE PERCENT: 67.7 % (ref 43–80)
PDW BLD-RTO: 16.1 FL (ref 11.5–15)
PLATELET # BLD: 221 E9/L (ref 130–450)
PMV BLD AUTO: 9.7 FL (ref 7–12)
POTASSIUM REFLEX MAGNESIUM: 4.1 MMOL/L (ref 3.5–5)
PROTHROMBIN TIME: 15.4 SEC (ref 9.3–12.4)
RBC # BLD: 3.91 E12/L (ref 3.5–5.5)
SODIUM BLD-SCNC: 143 MMOL/L (ref 132–146)
WBC # BLD: 5.6 E9/L (ref 4.5–11.5)

## 2021-06-21 PROCEDURE — 85610 PROTHROMBIN TIME: CPT

## 2021-06-21 PROCEDURE — 1200000000 HC SEMI PRIVATE

## 2021-06-21 PROCEDURE — 85730 THROMBOPLASTIN TIME PARTIAL: CPT

## 2021-06-21 PROCEDURE — 2580000003 HC RX 258: Performed by: INTERNAL MEDICINE

## 2021-06-21 PROCEDURE — 6370000000 HC RX 637 (ALT 250 FOR IP): Performed by: INTERNAL MEDICINE

## 2021-06-21 PROCEDURE — 85025 COMPLETE CBC W/AUTO DIFF WBC: CPT

## 2021-06-21 PROCEDURE — 36415 COLL VENOUS BLD VENIPUNCTURE: CPT

## 2021-06-21 PROCEDURE — 80048 BASIC METABOLIC PNL TOTAL CA: CPT

## 2021-06-21 PROCEDURE — 6360000002 HC RX W HCPCS: Performed by: INTERNAL MEDICINE

## 2021-06-21 PROCEDURE — 99232 SBSQ HOSP IP/OBS MODERATE 35: CPT | Performed by: INTERNAL MEDICINE

## 2021-06-21 PROCEDURE — 2500000003 HC RX 250 WO HCPCS: Performed by: INTERNAL MEDICINE

## 2021-06-21 RX ADMIN — MORPHINE SULFATE 2 MG: 2 INJECTION, SOLUTION INTRAMUSCULAR; INTRAVENOUS at 04:26

## 2021-06-21 RX ADMIN — MORPHINE SULFATE 2 MG: 2 INJECTION, SOLUTION INTRAMUSCULAR; INTRAVENOUS at 08:55

## 2021-06-21 RX ADMIN — ACETAMINOPHEN 650 MG: 325 TABLET ORAL at 08:55

## 2021-06-21 RX ADMIN — Medication 10 ML: at 21:44

## 2021-06-21 RX ADMIN — METRONIDAZOLE 500 MG: 500 INJECTION, SOLUTION INTRAVENOUS at 15:37

## 2021-06-21 RX ADMIN — PANTOPRAZOLE SODIUM 40 MG: 40 TABLET, DELAYED RELEASE ORAL at 10:15

## 2021-06-21 RX ADMIN — LEVOTHYROXINE SODIUM 125 MCG: 125 TABLET ORAL at 10:14

## 2021-06-21 RX ADMIN — MORPHINE SULFATE 2 MG: 2 INJECTION, SOLUTION INTRAMUSCULAR; INTRAVENOUS at 21:43

## 2021-06-21 RX ADMIN — METRONIDAZOLE 500 MG: 500 INJECTION, SOLUTION INTRAVENOUS at 23:30

## 2021-06-21 RX ADMIN — METOPROLOL SUCCINATE 25 MG: 25 TABLET, EXTENDED RELEASE ORAL at 21:42

## 2021-06-21 RX ADMIN — LEVOFLOXACIN 750 MG: 5 INJECTION, SOLUTION INTRAVENOUS at 08:59

## 2021-06-21 RX ADMIN — METRONIDAZOLE 500 MG: 500 INJECTION, SOLUTION INTRAVENOUS at 06:45

## 2021-06-21 ASSESSMENT — PAIN SCALES - GENERAL
PAINLEVEL_OUTOF10: 7
PAINLEVEL_OUTOF10: 6
PAINLEVEL_OUTOF10: 6
PAINLEVEL_OUTOF10: 7

## 2021-06-21 ASSESSMENT — PAIN DESCRIPTION - PROGRESSION: CLINICAL_PROGRESSION: NOT CHANGED

## 2021-06-21 ASSESSMENT — PAIN DESCRIPTION - PAIN TYPE: TYPE: ACUTE PAIN

## 2021-06-21 ASSESSMENT — PAIN DESCRIPTION - DESCRIPTORS: DESCRIPTORS: CONSTANT;ACHING

## 2021-06-21 ASSESSMENT — PAIN DESCRIPTION - FREQUENCY: FREQUENCY: CONTINUOUS

## 2021-06-21 ASSESSMENT — PAIN - FUNCTIONAL ASSESSMENT: PAIN_FUNCTIONAL_ASSESSMENT: ACTIVITIES ARE NOT PREVENTED

## 2021-06-21 ASSESSMENT — PAIN DESCRIPTION - ONSET: ONSET: ON-GOING

## 2021-06-21 ASSESSMENT — PAIN DESCRIPTION - ORIENTATION: ORIENTATION: LEFT

## 2021-06-21 ASSESSMENT — PAIN DESCRIPTION - LOCATION: LOCATION: ABDOMEN

## 2021-06-21 NOTE — PROGRESS NOTES
GENERAL SURGERY  DAILY PROGRESS NOTE  6/21/2021  No chief complaint on file. Subjective:  Patient still having pain this morning but it is tolerable with medications. No nausea or vomiting overnight. Objective:  /71   Pulse 65   Temp 98.4 °F (36.9 °C) (Oral)   Resp 20   Ht 5' 1\" (1.549 m)   Wt 178 lb 14.4 oz (81.1 kg)   LMP 02/06/2015   SpO2 93%   BMI 33.80 kg/m²     GENERAL:  Laying in bed, awake, alert, cooperative, no apparent distress  HEAD: Normocephalic, atraumatic  EYES: No sclera icterus, pupils equal  LUNGS:  No increased work of breathing  CARDIOVASCULAR:  RR  ABDOMEN:  Soft, tender in LLQ, non-distended  EXTREMITIES: No edema or swelling  SKIN: Warm and dry    Assessment/Plan:  48 y.o. female with perforated diverticulitis with abscess formation    - IR unable to drain abscess at this time  - continue IV antibiotics  - ok for clears if tolerated     Electronically signed by Mariana Minor MD on 6/21/2021 at 9:36 AM     The patient was seen and examined and the chart was reviewed. The patient is afebrile and normotensive. The patient states that her abdominal pain has improved. The interventional radiologist have decided that the area is not amendable to percutaneous drainage at this time. On examination: The abdomen is soft, less tender and less distended. Plan:  Clear liquid diet advance as tolerated. Continue antibiotics as prescribed. Monitor the patient's temperature and white blood cell count. Continue Levaquin and Flagyl.

## 2021-06-21 NOTE — PROGRESS NOTES
Abscess drain request reviewed. CT ABD/pel reviewed. 50F w/ small diverticular phlegmon/ forming abscess. The lesions is not quite suitable for drainage at this time.      WBC not elevated, vitals stable.    -cont IV abx  -can recheck CT abd/pel later this week, late Thurs if abscess is better formed for drainage vs. Poss improve w/ abx      Edna Quan MD  Vascular and Interventional Radiology ([facility])    Daytime direct number to [address]: [address]:      [address] [place] [address] Rd [address]: [phone]  Outpatient IR [phone]

## 2021-06-21 NOTE — CARE COORDINATION
Social Work / Discharge Planning :SW met with patient and family and ex-plaiend role as discharge planner/ transition of care. Patient admitted with Acute Diverticulitis. Patient on IV antibiotics and possible drain placement. Patient can manage her drain but if needs IV antibiotics, then agreeable to [facility]. AWait plan. Patient independent and resides with spouse. NO HHC/SNF hx. Patient PCP is DR Yoko Benitez in [place] and she uses [facility] in [place]. AWait Plan. SW to follow.  Electronically signed by JAIR Padron on 6/21/21 at 9:55 AM EDT

## 2021-06-22 LAB
ANION GAP SERPL CALCULATED.3IONS-SCNC: 9 MMOL/L (ref 7–16)
BASOPHILS ABSOLUTE: 0.03 E9/L (ref 0–0.2)
BASOPHILS RELATIVE PERCENT: 0.5 % (ref 0–2)
BUN BLDV-MCNC: 11 MG/DL (ref 6–20)
CALCIUM SERPL-MCNC: 9.2 MG/DL (ref 8.6–10.2)
CHLORIDE BLD-SCNC: 103 MMOL/L (ref 98–107)
CO2: 28 MMOL/L (ref 22–29)
CREAT SERPL-MCNC: 0.9 MG/DL (ref 0.5–1)
EOSINOPHILS ABSOLUTE: 0.17 E9/L (ref 0.05–0.5)
EOSINOPHILS RELATIVE PERCENT: 2.8 % (ref 0–6)
GFR AFRICAN AMERICAN: >60
GFR NON-AFRICAN AMERICAN: >60 ML/MIN/1.73
GLUCOSE BLD-MCNC: 99 MG/DL (ref 74–99)
HCT VFR BLD CALC: 34.8 % (ref 34–48)
HEMOGLOBIN: 10.5 G/DL (ref 11.5–15.5)
IMMATURE GRANULOCYTES #: 0.02 E9/L
IMMATURE GRANULOCYTES %: 0.3 % (ref 0–5)
LYMPHOCYTES ABSOLUTE: 1.11 E9/L (ref 1.5–4)
LYMPHOCYTES RELATIVE PERCENT: 18.2 % (ref 20–42)
MCH RBC QN AUTO: 24.2 PG (ref 26–35)
MCHC RBC AUTO-ENTMCNC: 30.2 % (ref 32–34.5)
MCV RBC AUTO: 80.4 FL (ref 80–99.9)
MONOCYTES ABSOLUTE: 0.44 E9/L (ref 0.1–0.95)
MONOCYTES RELATIVE PERCENT: 7.2 % (ref 2–12)
NEUTROPHILS ABSOLUTE: 4.33 E9/L (ref 1.8–7.3)
NEUTROPHILS RELATIVE PERCENT: 71 % (ref 43–80)
PDW BLD-RTO: 16.2 FL (ref 11.5–15)
PLATELET # BLD: 253 E9/L (ref 130–450)
PMV BLD AUTO: 9.7 FL (ref 7–12)
POTASSIUM REFLEX MAGNESIUM: 3.7 MMOL/L (ref 3.5–5)
RBC # BLD: 4.33 E12/L (ref 3.5–5.5)
SODIUM BLD-SCNC: 140 MMOL/L (ref 132–146)
WBC # BLD: 6.1 E9/L (ref 4.5–11.5)

## 2021-06-22 PROCEDURE — 36415 COLL VENOUS BLD VENIPUNCTURE: CPT

## 2021-06-22 PROCEDURE — 2500000003 HC RX 250 WO HCPCS: Performed by: INTERNAL MEDICINE

## 2021-06-22 PROCEDURE — 6360000002 HC RX W HCPCS: Performed by: INTERNAL MEDICINE

## 2021-06-22 PROCEDURE — 80048 BASIC METABOLIC PNL TOTAL CA: CPT

## 2021-06-22 PROCEDURE — 6370000000 HC RX 637 (ALT 250 FOR IP): Performed by: INTERNAL MEDICINE

## 2021-06-22 PROCEDURE — 6370000000 HC RX 637 (ALT 250 FOR IP): Performed by: STUDENT IN AN ORGANIZED HEALTH CARE EDUCATION/TRAINING PROGRAM

## 2021-06-22 PROCEDURE — 1200000000 HC SEMI PRIVATE

## 2021-06-22 PROCEDURE — 2580000003 HC RX 258: Performed by: INTERNAL MEDICINE

## 2021-06-22 PROCEDURE — 85025 COMPLETE CBC W/AUTO DIFF WBC: CPT

## 2021-06-22 PROCEDURE — 99232 SBSQ HOSP IP/OBS MODERATE 35: CPT | Performed by: INTERNAL MEDICINE

## 2021-06-22 RX ORDER — OXYCODONE HYDROCHLORIDE 5 MG/1
5 TABLET ORAL EVERY 4 HOURS PRN
Status: DISCONTINUED | OUTPATIENT
Start: 2021-06-22 | End: 2021-06-25 | Stop reason: HOSPADM

## 2021-06-22 RX ADMIN — OXYCODONE HYDROCHLORIDE 5 MG: 5 TABLET ORAL at 22:36

## 2021-06-22 RX ADMIN — METRONIDAZOLE 500 MG: 500 INJECTION, SOLUTION INTRAVENOUS at 15:54

## 2021-06-22 RX ADMIN — ACETAMINOPHEN 650 MG: 325 TABLET ORAL at 22:36

## 2021-06-22 RX ADMIN — MORPHINE SULFATE 2 MG: 2 INJECTION, SOLUTION INTRAMUSCULAR; INTRAVENOUS at 09:19

## 2021-06-22 RX ADMIN — Medication 10 ML: at 22:37

## 2021-06-22 RX ADMIN — METRONIDAZOLE 500 MG: 500 INJECTION, SOLUTION INTRAVENOUS at 22:39

## 2021-06-22 RX ADMIN — Medication 10 ML: at 09:20

## 2021-06-22 RX ADMIN — OXYCODONE HYDROCHLORIDE 5 MG: 5 TABLET ORAL at 17:50

## 2021-06-22 RX ADMIN — METOPROLOL SUCCINATE 25 MG: 25 TABLET, EXTENDED RELEASE ORAL at 22:35

## 2021-06-22 RX ADMIN — LEVOFLOXACIN 750 MG: 5 INJECTION, SOLUTION INTRAVENOUS at 09:20

## 2021-06-22 RX ADMIN — PANTOPRAZOLE SODIUM 40 MG: 40 TABLET, DELAYED RELEASE ORAL at 06:16

## 2021-06-22 RX ADMIN — METRONIDAZOLE 500 MG: 500 INJECTION, SOLUTION INTRAVENOUS at 06:30

## 2021-06-22 RX ADMIN — LEVOTHYROXINE SODIUM 125 MCG: 125 TABLET ORAL at 06:16

## 2021-06-22 ASSESSMENT — PAIN DESCRIPTION - DESCRIPTORS
DESCRIPTORS: CONSTANT;ACHING
DESCRIPTORS: CONSTANT

## 2021-06-22 ASSESSMENT — PAIN DESCRIPTION - ONSET
ONSET: ON-GOING
ONSET: ON-GOING

## 2021-06-22 ASSESSMENT — PAIN SCALES - GENERAL
PAINLEVEL_OUTOF10: 8
PAINLEVEL_OUTOF10: 7
PAINLEVEL_OUTOF10: 7
PAINLEVEL_OUTOF10: 6

## 2021-06-22 ASSESSMENT — PAIN - FUNCTIONAL ASSESSMENT
PAIN_FUNCTIONAL_ASSESSMENT: ACTIVITIES ARE NOT PREVENTED
PAIN_FUNCTIONAL_ASSESSMENT: ACTIVITIES ARE NOT PREVENTED

## 2021-06-22 ASSESSMENT — PAIN DESCRIPTION - ORIENTATION
ORIENTATION: LEFT
ORIENTATION: LEFT

## 2021-06-22 ASSESSMENT — PAIN DESCRIPTION - LOCATION
LOCATION: ABDOMEN
LOCATION: ABDOMEN

## 2021-06-22 ASSESSMENT — PAIN DESCRIPTION - PROGRESSION
CLINICAL_PROGRESSION: NOT CHANGED
CLINICAL_PROGRESSION: NOT CHANGED

## 2021-06-22 ASSESSMENT — PAIN DESCRIPTION - PAIN TYPE
TYPE: ACUTE PAIN
TYPE: ACUTE PAIN

## 2021-06-22 ASSESSMENT — PAIN DESCRIPTION - FREQUENCY
FREQUENCY: CONTINUOUS
FREQUENCY: CONTINUOUS

## 2021-06-22 NOTE — PROGRESS NOTES
[facility] Hospitalist   Progress Note    Admitting Date and Time: 6/19/2021 10:37 PM  Admit Dx: Acute diverticulitis [K57.92]    Subjective:    Patient was admitted with Acute diverticulitis [K57.92].  Patient denies fever, chills, cp, sob, n/v.     levothyroxine  125 mcg Oral Daily    metoprolol succinate  25 mg Oral Nightly    pantoprazole  40 mg Oral QAM AC    metroNIDAZOLE  500 mg Intravenous Q8H    sodium chloride flush  10 mL Intravenous 2 times per day    levofloxacin  750 mg Intravenous Q24H     sodium chloride flush, 10 mL, PRN  sodium chloride, 25 mL, PRN  polyethylene glycol, 17 g, Daily PRN  acetaminophen, 650 mg, Q6H PRN   Or  acetaminophen, 650 mg, Q6H PRN  morphine, 2 mg, Q4H PRN         Objective:    /75   Pulse 66   Temp 98.1 °F (36.7 °C) (Oral)   Resp 16   Ht 5' 1\" (1.549 m)   Wt 178 lb 14.4 oz (81.1 kg)   LMP 02/06/2015   SpO2 95%   BMI 33.80 kg/m²   Skin: warm and dry, no rash or erythema  Pulmonary/Chest: clear to auscultation bilaterally- no wheezes, rales or rhonchi, normal air movement, no respiratory distress  Cardiovascular: rhythm reg at rate of 70  Abdomen: pos bs, soft, mild to mod tenderness, no guarding, rebound or rigidity  Extremities: no cyanosis, no clubbing and no edema      Recent Labs     06/19/21  1728 06/21/21  0507    143   K 3.8 4.1    107   CO2 27 28   BUN 11 10   CREATININE 0.9 0.8   GLUCOSE 103* 101*   CALCIUM 9.5 8.9       Recent Labs     06/19/21  1728 06/21/21  0507   WBC 7.5 5.6   RBC 4.53 3.91   HGB 11.2* 9.8*   HCT 36.6 32.1*   MCV 80.8 82.1   MCH 24.7* 25.1*   MCHC 30.6* 30.5*   RDW 16.4* 16.1*    221   MPV 10.3 9.7       CBC with Differential:    Lab Results   Component Value Date    WBC 5.6 06/21/2021    RBC 3.91 06/21/2021    HGB 9.8 06/21/2021    HCT 32.1 06/21/2021     06/21/2021    MCV 82.1 06/21/2021    MCH 25.1 06/21/2021    MCHC 30.5 06/21/2021    RDW 16.1 06/21/2021    LYMPHOPCT 19.3 06/21/2021    MONOPCT 8.2 06/21/2021    BASOPCT 0.5 06/21/2021    MONOSABS 0.46 06/21/2021    LYMPHSABS 1.08 06/21/2021    EOSABS 0.23 06/21/2021    BASOSABS 0.03 06/21/2021     BMP:    Lab Results   Component Value Date     06/21/2021    K 4.1 06/21/2021     06/21/2021    CO2 28 06/21/2021    BUN 10 06/21/2021    LABALBU 4.2 06/19/2021    CREATININE 0.8 06/21/2021    CALCIUM 8.9 06/21/2021    GFRAA >60 06/21/2021    LABGLOM >60 06/21/2021    GLUCOSE 101 06/21/2021        Radiology:   IR Interventional Radiology Procedure Request    (Results Pending)       Assessment:    Active Problems:    Acute diverticulitis    Diverticulitis of intestine with perforation and abscess    Hypothyroidism    Essential hypertension    Gastroesophageal reflux disease  Resolved Problems:    * No resolved hospital problems. *      Plan:  1. Diverticulitis with perforation and abscess  Surgery following  continue abx. IR unable to have abscess drained currently.    2. Hypothyroidism continue med  3. htn continue med  4. gerd continue med        Electronically signed by Rochelle Jacobson DO on 6/21/2021 at 8:30 PM

## 2021-06-22 NOTE — PROGRESS NOTES
Verified patients diet with surgical residents, [id number] Tamara Cisneros to leave with regular diet.      Electronically signed by Ruben Echevarria RN on 6/22/2021 at 1:38 PM

## 2021-06-22 NOTE — PROGRESS NOTES
GENERAL SURGERY  DAILY PROGRESS NOTE  6/22/2021  No chief complaint on file. Subjective:  Patient states she feels \"gassy\". Pain is stable. Tolerating clears. No nausea or vomiting overnight. Objective:  /75   Pulse 66   Temp 98.1 °F (36.7 °C) (Oral)   Resp 16   Ht 5' 1\" (1.549 m)   Wt 178 lb 14.4 oz (81.1 kg)   LMP 02/06/2015   SpO2 95%   BMI 33.80 kg/m²     GENERAL:  Laying in bed, awake, alert, cooperative, no apparent distress  HEAD: Normocephalic, atraumatic  EYES: No sclera icterus, pupils equal  LUNGS:  No increased work of breathing  CARDIOVASCULAR:  RR  ABDOMEN:  Soft, tender in LLQ, non-distended  EXTREMITIES: No edema or swelling  SKIN: Warm and dry    Assessment/Plan:  48 y.o. female with perforated diverticulitis with abscess formation    - IR unable to drain abscess at this time  - continue IV antibiotics levaquin, flagyl  - continue clears    Will be d/w Dr. Lamont Sen    Electronically signed by Alanna Chong DO on 6/22/2021 at 5:44 AM     The patient was seen and examined and the chart was reviewed. The patient was found to have a perforated diverticulum which was contained. The patient did not have an abscess that was amendable to IR drainage. Overall, the patient states that she still has some moderate amount of discomfort. The patient denies any other significant symptomatology. The patient is tolerating oral intake. Plan:  If the patient has persistent symptoms then a CT of the abdomen and pelvis with contrast will be performed tomorrow. Possible consultation to infectious disease for antibiotic therapy.

## 2021-06-23 ENCOUNTER — APPOINTMENT (OUTPATIENT)
Dept: CT IMAGING | Age: 51
DRG: 392 | End: 2021-06-23
Attending: INTERNAL MEDICINE
Payer: COMMERCIAL

## 2021-06-23 LAB
ANION GAP SERPL CALCULATED.3IONS-SCNC: 7 MMOL/L (ref 7–16)
BASOPHILS ABSOLUTE: 0.02 E9/L (ref 0–0.2)
BASOPHILS RELATIVE PERCENT: 0.4 % (ref 0–2)
BUN BLDV-MCNC: 10 MG/DL (ref 6–20)
CALCIUM SERPL-MCNC: 9.3 MG/DL (ref 8.6–10.2)
CHLORIDE BLD-SCNC: 105 MMOL/L (ref 98–107)
CO2: 27 MMOL/L (ref 22–29)
CREAT SERPL-MCNC: 0.9 MG/DL (ref 0.5–1)
EOSINOPHILS ABSOLUTE: 0.19 E9/L (ref 0.05–0.5)
EOSINOPHILS RELATIVE PERCENT: 3.8 % (ref 0–6)
GFR AFRICAN AMERICAN: >60
GFR NON-AFRICAN AMERICAN: >60 ML/MIN/1.73
GLUCOSE BLD-MCNC: 90 MG/DL (ref 74–99)
HCT VFR BLD CALC: 35 % (ref 34–48)
HEMOGLOBIN: 10.5 G/DL (ref 11.5–15.5)
IMMATURE GRANULOCYTES #: 0.02 E9/L
IMMATURE GRANULOCYTES %: 0.4 % (ref 0–5)
LYMPHOCYTES ABSOLUTE: 0.99 E9/L (ref 1.5–4)
LYMPHOCYTES RELATIVE PERCENT: 19.9 % (ref 20–42)
MCH RBC QN AUTO: 24.8 PG (ref 26–35)
MCHC RBC AUTO-ENTMCNC: 30 % (ref 32–34.5)
MCV RBC AUTO: 82.5 FL (ref 80–99.9)
MONOCYTES ABSOLUTE: 0.37 E9/L (ref 0.1–0.95)
MONOCYTES RELATIVE PERCENT: 7.4 % (ref 2–12)
NEUTROPHILS ABSOLUTE: 3.38 E9/L (ref 1.8–7.3)
NEUTROPHILS RELATIVE PERCENT: 68.1 % (ref 43–80)
PDW BLD-RTO: 16.6 FL (ref 11.5–15)
PLATELET # BLD: 246 E9/L (ref 130–450)
PMV BLD AUTO: 9.8 FL (ref 7–12)
POTASSIUM REFLEX MAGNESIUM: 3.7 MMOL/L (ref 3.5–5)
RBC # BLD: 4.24 E12/L (ref 3.5–5.5)
SODIUM BLD-SCNC: 139 MMOL/L (ref 132–146)
WBC # BLD: 5 E9/L (ref 4.5–11.5)

## 2021-06-23 PROCEDURE — 36415 COLL VENOUS BLD VENIPUNCTURE: CPT

## 2021-06-23 PROCEDURE — 85025 COMPLETE CBC W/AUTO DIFF WBC: CPT

## 2021-06-23 PROCEDURE — 6370000000 HC RX 637 (ALT 250 FOR IP): Performed by: STUDENT IN AN ORGANIZED HEALTH CARE EDUCATION/TRAINING PROGRAM

## 2021-06-23 PROCEDURE — 74177 CT ABD & PELVIS W/CONTRAST: CPT

## 2021-06-23 PROCEDURE — 6360000002 HC RX W HCPCS: Performed by: FAMILY MEDICINE

## 2021-06-23 PROCEDURE — 6360000002 HC RX W HCPCS: Performed by: INTERNAL MEDICINE

## 2021-06-23 PROCEDURE — 6370000000 HC RX 637 (ALT 250 FOR IP): Performed by: INTERNAL MEDICINE

## 2021-06-23 PROCEDURE — 2500000003 HC RX 250 WO HCPCS: Performed by: INTERNAL MEDICINE

## 2021-06-23 PROCEDURE — 1200000000 HC SEMI PRIVATE

## 2021-06-23 PROCEDURE — 99232 SBSQ HOSP IP/OBS MODERATE 35: CPT | Performed by: INTERNAL MEDICINE

## 2021-06-23 PROCEDURE — 80048 BASIC METABOLIC PNL TOTAL CA: CPT

## 2021-06-23 PROCEDURE — 6360000004 HC RX CONTRAST MEDICATION: Performed by: RADIOLOGY

## 2021-06-23 PROCEDURE — 2580000003 HC RX 258: Performed by: INTERNAL MEDICINE

## 2021-06-23 RX ORDER — ONDANSETRON 2 MG/ML
4 INJECTION INTRAMUSCULAR; INTRAVENOUS EVERY 6 HOURS PRN
Status: DISCONTINUED | OUTPATIENT
Start: 2021-06-23 | End: 2021-06-25 | Stop reason: HOSPADM

## 2021-06-23 RX ADMIN — OXYCODONE HYDROCHLORIDE 5 MG: 5 TABLET ORAL at 09:17

## 2021-06-23 RX ADMIN — Medication 10 ML: at 09:17

## 2021-06-23 RX ADMIN — OXYCODONE HYDROCHLORIDE 5 MG: 5 TABLET ORAL at 23:24

## 2021-06-23 RX ADMIN — PANTOPRAZOLE SODIUM 40 MG: 40 TABLET, DELAYED RELEASE ORAL at 05:52

## 2021-06-23 RX ADMIN — LEVOTHYROXINE SODIUM 125 MCG: 125 TABLET ORAL at 05:53

## 2021-06-23 RX ADMIN — METRONIDAZOLE 500 MG: 500 INJECTION, SOLUTION INTRAVENOUS at 15:31

## 2021-06-23 RX ADMIN — ONDANSETRON 4 MG: 2 INJECTION INTRAMUSCULAR; INTRAVENOUS at 09:17

## 2021-06-23 RX ADMIN — OXYCODONE HYDROCHLORIDE 5 MG: 5 TABLET ORAL at 13:28

## 2021-06-23 RX ADMIN — IOPAMIDOL 75 ML: 755 INJECTION, SOLUTION INTRAVENOUS at 08:53

## 2021-06-23 RX ADMIN — LEVOFLOXACIN 750 MG: 5 INJECTION, SOLUTION INTRAVENOUS at 09:17

## 2021-06-23 RX ADMIN — OXYCODONE HYDROCHLORIDE 5 MG: 5 TABLET ORAL at 18:32

## 2021-06-23 RX ADMIN — Medication 10 ML: at 20:17

## 2021-06-23 RX ADMIN — SODIUM CHLORIDE, PRESERVATIVE FREE 10 ML: 5 INJECTION INTRAVENOUS at 12:58

## 2021-06-23 RX ADMIN — METOPROLOL SUCCINATE 25 MG: 25 TABLET, EXTENDED RELEASE ORAL at 20:16

## 2021-06-23 RX ADMIN — POLYETHYLENE GLYCOL 3350 17 G: 17 POWDER, FOR SOLUTION ORAL at 13:28

## 2021-06-23 RX ADMIN — METRONIDAZOLE 500 MG: 500 INJECTION, SOLUTION INTRAVENOUS at 23:24

## 2021-06-23 RX ADMIN — METRONIDAZOLE 500 MG: 500 INJECTION, SOLUTION INTRAVENOUS at 06:24

## 2021-06-23 ASSESSMENT — PAIN - FUNCTIONAL ASSESSMENT
PAIN_FUNCTIONAL_ASSESSMENT: ACTIVITIES ARE NOT PREVENTED
PAIN_FUNCTIONAL_ASSESSMENT: PREVENTS OR INTERFERES SOME ACTIVE ACTIVITIES AND ADLS

## 2021-06-23 ASSESSMENT — PAIN SCALES - GENERAL
PAINLEVEL_OUTOF10: 6
PAINLEVEL_OUTOF10: 5
PAINLEVEL_OUTOF10: 7
PAINLEVEL_OUTOF10: 7
PAINLEVEL_OUTOF10: 4
PAINLEVEL_OUTOF10: 7

## 2021-06-23 ASSESSMENT — PAIN DESCRIPTION - DESCRIPTORS
DESCRIPTORS: ACHING;DISCOMFORT;SORE
DESCRIPTORS: CONSTANT;ACHING

## 2021-06-23 ASSESSMENT — PAIN DESCRIPTION - PAIN TYPE
TYPE: ACUTE PAIN

## 2021-06-23 ASSESSMENT — PAIN DESCRIPTION - FREQUENCY: FREQUENCY: CONTINUOUS

## 2021-06-23 ASSESSMENT — PAIN DESCRIPTION - LOCATION
LOCATION: ABDOMEN

## 2021-06-23 ASSESSMENT — PAIN DESCRIPTION - ONSET: ONSET: ON-GOING

## 2021-06-23 ASSESSMENT — PAIN DESCRIPTION - PROGRESSION: CLINICAL_PROGRESSION: NOT CHANGED

## 2021-06-23 ASSESSMENT — PAIN DESCRIPTION - ORIENTATION
ORIENTATION: LEFT
ORIENTATION: LEFT

## 2021-06-23 NOTE — PLAN OF CARE
Problem: Pain:  Description: Pain management should include both nonpharmacologic and pharmacologic interventions.   Goal: Pain level will decrease  Description: Pain level will decrease  6/23/2021 0211 by Sary Jordan RN  Outcome: Ongoing  6/22/2021 1823 by Josué Vasquez RN  Outcome: Met This Shift  Goal: Control of acute pain  Description: Control of acute pain  6/23/2021 0211 by Sary Jordan RN  Outcome: Ongoing  6/22/2021 1823 by Josué Vasquez RN  Outcome: Met This Shift  Goal: Control of chronic pain  Description: Control of chronic pain  6/23/2021 0211 by Sary Jordan RN  Outcome: Ongoing  6/22/2021 1823 by Josué Vasquez RN  Outcome: Met This Shift

## 2021-06-23 NOTE — PROGRESS NOTES
[facility]   Progress Note    Admitting Date and Time: 6/19/2021 10:37 PM  Admit Dx: Acute diverticulitis [K57.92]    Subjective:    Patient was admitted with Acute diverticulitis [K57.92].  Patient denies fever, chills, cp, sob, n/v.     levothyroxine  125 mcg Oral Daily    metoprolol succinate  25 mg Oral Nightly    pantoprazole  40 mg Oral QAM AC    metroNIDAZOLE  500 mg Intravenous Q8H    sodium chloride flush  10 mL Intravenous 2 times per day    levofloxacin  750 mg Intravenous Q24H     oxyCODONE, 5 mg, Q4H PRN  sodium chloride flush, 10 mL, PRN  sodium chloride, 25 mL, PRN  polyethylene glycol, 17 g, Daily PRN  acetaminophen, 650 mg, Q6H PRN   Or  acetaminophen, 650 mg, Q6H PRN         Objective:    /67   Pulse 72   Temp 97.9 °F (36.6 °C) (Oral)   Resp 20   Ht 5' 1\" (1.549 m)   Wt 178 lb 14.4 oz (81.1 kg)   LMP 02/06/2015   SpO2 94%   BMI 33.80 kg/m²   Skin: warm and dry, no rash or erythema  Pulmonary/Chest: clear to auscultation bilaterally- no wheezes, rales or rhonchi, normal air movement, no respiratory distress  Cardiovascular: rhythm reg at rate of 76  Abdomen: soft, non-tender, non-distended, normal bowel sounds, no masses or organomegaly  Extremities: no cyanosis, no clubbing and no edema      Recent Labs     06/21/21  0507 06/22/21  0522    140   K 4.1 3.7    103   CO2 28 28   BUN 10 11   CREATININE 0.8 0.9   GLUCOSE 101* 99   CALCIUM 8.9 9.2       Recent Labs     06/21/21  0507 06/22/21  0522   WBC 5.6 6.1   RBC 3.91 4.33   HGB 9.8* 10.5*   HCT 32.1* 34.8   MCV 82.1 80.4   MCH 25.1* 24.2*   MCHC 30.5* 30.2*   RDW 16.1* 16.2*    253   MPV 9.7 9.7       CBC with Differential:    Lab Results   Component Value Date    WBC 6.1 06/22/2021    RBC 4.33 06/22/2021    HGB 10.5 06/22/2021    HCT 34.8 06/22/2021     06/22/2021    MCV 80.4 06/22/2021    MCH 24.2 06/22/2021    MCHC 30.2 06/22/2021    RDW 16.2 06/22/2021    LYMPHOPCT 18.2 06/22/2021    MONOPCT 7.2 06/22/2021    BASOPCT 0.5 06/22/2021    MONOSABS 0.44 06/22/2021    LYMPHSABS 1.11 06/22/2021    EOSABS 0.17 06/22/2021    BASOSABS 0.03 06/22/2021     BMP:    Lab Results   Component Value Date     06/22/2021    K 3.7 06/22/2021     06/22/2021    CO2 28 06/22/2021    BUN 11 06/22/2021    LABALBU 4.2 06/19/2021    CREATININE 0.9 06/22/2021    CALCIUM 9.2 06/22/2021    GFRAA >60 06/22/2021    LABGLOM >60 06/22/2021    GLUCOSE 99 06/22/2021        Radiology:   IR Interventional Radiology Procedure Request    (Results Pending)       Assessment:    Active Problems:    Acute diverticulitis    Diverticulitis of intestine with perforation and abscess    Hypothyroidism    Essential hypertension    Gastroesophageal reflux disease  Resolved Problems:    * No resolved hospital problems. *      Plan:  1. Diverticulitis with perforation and abscess  Surgery following  continue abx. IR unable to have abscess drained currently. surgery mentions doing a possible ct scan tomorrow. Pt switched to po pain med  2.  Hypothyroidism continue med  3. htn continue med  4. gerd continue med        Electronically signed by Tierney Gordon DO on 6/22/2021 at 8:07 PM

## 2021-06-23 NOTE — CARE COORDINATION
Chart reviewed pt for repeat CT Abd today. Await results- ? Need IR drainage. Continued IV abx therapy. D/C plan is HOME with her  independently.  - await clinical progress

## 2021-06-23 NOTE — PROGRESS NOTES
GENERAL SURGERY  DAILY PROGRESS NOTE  6/23/2021  CC: abdominal pain    Subjective:  Still with pain - still the same. Tolerated diet. No n/v.    Objective:  /84   Pulse 78   Temp 98.6 °F (37 °C) (Oral)   Resp 18   Ht 5' 1\" (1.549 m)   Wt 178 lb 14.4 oz (81.1 kg)   LMP 02/06/2015   SpO2 97%   BMI 33.80 kg/m²     GENERAL:  Laying in bed, awake, alert, cooperative, no apparent distress  HEAD: Normocephalic, atraumatic  EYES: No sclera icterus, pupils equal  LUNGS:  No increased work of breathing  CARDIOVASCULAR:  RR  ABDOMEN:  Soft, tender in LLQ - same as yesterday, non-distended  EXTREMITIES: No edema or swelling  SKIN: Warm and dry    Assessment/Plan:  48 y.o. female with perforated diverticulitis with abscess formation    - continue IV antibiotics levaquin, flagyl  - IVF  - IV Abx  - will discuss repeat CT scan today for abscess follow up    Will be d/w Dr. Shakir Manzanares    Electronically signed by Babs Wolfe MD on 6/23/2021 at 6:13 AM     The patient was seen and examined and the chart was reviewed. The patient will continue IV antibiotics. The patient will undergo a repeat CT of the abdomen and pelvis.

## 2021-06-24 ENCOUNTER — APPOINTMENT (OUTPATIENT)
Dept: ULTRASOUND IMAGING | Age: 51
DRG: 392 | End: 2021-06-24
Attending: INTERNAL MEDICINE
Payer: COMMERCIAL

## 2021-06-24 LAB
ANION GAP SERPL CALCULATED.3IONS-SCNC: 7 MMOL/L (ref 7–16)
BASOPHILS ABSOLUTE: 0.03 E9/L (ref 0–0.2)
BASOPHILS RELATIVE PERCENT: 0.5 % (ref 0–2)
BUN BLDV-MCNC: 13 MG/DL (ref 6–20)
CALCIUM SERPL-MCNC: 8.8 MG/DL (ref 8.6–10.2)
CHLORIDE BLD-SCNC: 106 MMOL/L (ref 98–107)
CO2: 27 MMOL/L (ref 22–29)
CREAT SERPL-MCNC: 0.8 MG/DL (ref 0.5–1)
EOSINOPHILS ABSOLUTE: 0.23 E9/L (ref 0.05–0.5)
EOSINOPHILS RELATIVE PERCENT: 3.7 % (ref 0–6)
GFR AFRICAN AMERICAN: >60
GFR NON-AFRICAN AMERICAN: >60 ML/MIN/1.73
GLUCOSE BLD-MCNC: 98 MG/DL (ref 74–99)
HCT VFR BLD CALC: 32.8 % (ref 34–48)
HEMOGLOBIN: 9.6 G/DL (ref 11.5–15.5)
IMMATURE GRANULOCYTES #: 0.02 E9/L
IMMATURE GRANULOCYTES %: 0.3 % (ref 0–5)
LYMPHOCYTES ABSOLUTE: 1.2 E9/L (ref 1.5–4)
LYMPHOCYTES RELATIVE PERCENT: 19.3 % (ref 20–42)
MCH RBC QN AUTO: 23.9 PG (ref 26–35)
MCHC RBC AUTO-ENTMCNC: 29.3 % (ref 32–34.5)
MCV RBC AUTO: 81.8 FL (ref 80–99.9)
MONOCYTES ABSOLUTE: 0.53 E9/L (ref 0.1–0.95)
MONOCYTES RELATIVE PERCENT: 8.5 % (ref 2–12)
NEUTROPHILS ABSOLUTE: 4.2 E9/L (ref 1.8–7.3)
NEUTROPHILS RELATIVE PERCENT: 67.7 % (ref 43–80)
PDW BLD-RTO: 16.5 FL (ref 11.5–15)
PLATELET # BLD: 243 E9/L (ref 130–450)
PMV BLD AUTO: 9.9 FL (ref 7–12)
POTASSIUM REFLEX MAGNESIUM: 4.2 MMOL/L (ref 3.5–5)
RBC # BLD: 4.01 E12/L (ref 3.5–5.5)
SODIUM BLD-SCNC: 140 MMOL/L (ref 132–146)
WBC # BLD: 6.2 E9/L (ref 4.5–11.5)

## 2021-06-24 PROCEDURE — 36415 COLL VENOUS BLD VENIPUNCTURE: CPT

## 2021-06-24 PROCEDURE — 2500000003 HC RX 250 WO HCPCS: Performed by: INTERNAL MEDICINE

## 2021-06-24 PROCEDURE — 6370000000 HC RX 637 (ALT 250 FOR IP): Performed by: INTERNAL MEDICINE

## 2021-06-24 PROCEDURE — 2580000003 HC RX 258: Performed by: INTERNAL MEDICINE

## 2021-06-24 PROCEDURE — 85025 COMPLETE CBC W/AUTO DIFF WBC: CPT

## 2021-06-24 PROCEDURE — 6360000002 HC RX W HCPCS: Performed by: INTERNAL MEDICINE

## 2021-06-24 PROCEDURE — 1200000000 HC SEMI PRIVATE

## 2021-06-24 PROCEDURE — 99232 SBSQ HOSP IP/OBS MODERATE 35: CPT | Performed by: INTERNAL MEDICINE

## 2021-06-24 PROCEDURE — 6370000000 HC RX 637 (ALT 250 FOR IP): Performed by: STUDENT IN AN ORGANIZED HEALTH CARE EDUCATION/TRAINING PROGRAM

## 2021-06-24 PROCEDURE — 80048 BASIC METABOLIC PNL TOTAL CA: CPT

## 2021-06-24 PROCEDURE — 76856 US EXAM PELVIC COMPLETE: CPT

## 2021-06-24 RX ORDER — CEFDINIR 300 MG/1
300 CAPSULE ORAL 2 TIMES DAILY
Qty: 20 CAPSULE | Refills: 0 | Status: SHIPPED | OUTPATIENT
Start: 2021-06-24 | End: 2021-06-25 | Stop reason: SDUPTHER

## 2021-06-24 RX ORDER — METRONIDAZOLE 500 MG/1
500 TABLET ORAL 3 TIMES DAILY
Qty: 30 TABLET | Refills: 0 | Status: SHIPPED | OUTPATIENT
Start: 2021-06-24 | End: 2021-06-25 | Stop reason: SDUPTHER

## 2021-06-24 RX ADMIN — LEVOTHYROXINE SODIUM 125 MCG: 125 TABLET ORAL at 06:10

## 2021-06-24 RX ADMIN — OXYCODONE HYDROCHLORIDE 5 MG: 5 TABLET ORAL at 05:05

## 2021-06-24 RX ADMIN — METOPROLOL SUCCINATE 25 MG: 25 TABLET, EXTENDED RELEASE ORAL at 21:47

## 2021-06-24 RX ADMIN — LEVOFLOXACIN 750 MG: 5 INJECTION, SOLUTION INTRAVENOUS at 08:20

## 2021-06-24 RX ADMIN — PANTOPRAZOLE SODIUM 40 MG: 40 TABLET, DELAYED RELEASE ORAL at 06:10

## 2021-06-24 RX ADMIN — METRONIDAZOLE 500 MG: 500 INJECTION, SOLUTION INTRAVENOUS at 23:27

## 2021-06-24 RX ADMIN — OXYCODONE HYDROCHLORIDE 5 MG: 5 TABLET ORAL at 11:55

## 2021-06-24 RX ADMIN — Medication 10 ML: at 21:51

## 2021-06-24 RX ADMIN — OXYCODONE HYDROCHLORIDE 5 MG: 5 TABLET ORAL at 21:47

## 2021-06-24 RX ADMIN — OXYCODONE HYDROCHLORIDE 5 MG: 5 TABLET ORAL at 17:33

## 2021-06-24 RX ADMIN — METRONIDAZOLE 500 MG: 500 INJECTION, SOLUTION INTRAVENOUS at 15:24

## 2021-06-24 RX ADMIN — POLYETHYLENE GLYCOL 3350 17 G: 17 POWDER, FOR SOLUTION ORAL at 21:47

## 2021-06-24 RX ADMIN — Medication 10 ML: at 08:20

## 2021-06-24 RX ADMIN — METRONIDAZOLE 500 MG: 500 INJECTION, SOLUTION INTRAVENOUS at 06:11

## 2021-06-24 ASSESSMENT — PAIN DESCRIPTION - PROGRESSION
CLINICAL_PROGRESSION: NOT CHANGED
CLINICAL_PROGRESSION: NOT CHANGED

## 2021-06-24 ASSESSMENT — PAIN SCALES - GENERAL
PAINLEVEL_OUTOF10: 4
PAINLEVEL_OUTOF10: 5
PAINLEVEL_OUTOF10: 9
PAINLEVEL_OUTOF10: 2
PAINLEVEL_OUTOF10: 6
PAINLEVEL_OUTOF10: 7
PAINLEVEL_OUTOF10: 6

## 2021-06-24 ASSESSMENT — PAIN DESCRIPTION - DESCRIPTORS
DESCRIPTORS: STABBING
DESCRIPTORS: STABBING

## 2021-06-24 ASSESSMENT — PAIN DESCRIPTION - FREQUENCY
FREQUENCY: INTERMITTENT
FREQUENCY: INTERMITTENT

## 2021-06-24 ASSESSMENT — PAIN DESCRIPTION - ORIENTATION: ORIENTATION: LEFT

## 2021-06-24 ASSESSMENT — PAIN DESCRIPTION - LOCATION
LOCATION: ABDOMEN

## 2021-06-24 ASSESSMENT — PAIN DESCRIPTION - ONSET
ONSET: ON-GOING
ONSET: ON-GOING

## 2021-06-24 ASSESSMENT — PAIN DESCRIPTION - PAIN TYPE: TYPE: ACUTE PAIN

## 2021-06-24 ASSESSMENT — PAIN - FUNCTIONAL ASSESSMENT: PAIN_FUNCTIONAL_ASSESSMENT: PREVENTS OR INTERFERES SOME ACTIVE ACTIVITIES AND ADLS

## 2021-06-24 NOTE — PROGRESS NOTES
[facility]   Progress Note    Admitting Date and Time: 6/19/2021 10:37 PM  Admit Dx: Acute diverticulitis [K57.92]    Subjective:    Patient was admitted with Acute diverticulitis [K57.92].  Patient denies fever, chills, cp, sob, n/v.     levothyroxine  125 mcg Oral Daily    metoprolol succinate  25 mg Oral Nightly    pantoprazole  40 mg Oral QAM AC    metroNIDAZOLE  500 mg Intravenous Q8H    sodium chloride flush  10 mL Intravenous 2 times per day    levofloxacin  750 mg Intravenous Q24H     ondansetron, 4 mg, Q6H PRN  oxyCODONE, 5 mg, Q4H PRN  sodium chloride flush, 10 mL, PRN  sodium chloride, 25 mL, PRN  polyethylene glycol, 17 g, Daily PRN  acetaminophen, 650 mg, Q6H PRN   Or  acetaminophen, 650 mg, Q6H PRN         Objective:    /73   Pulse 67   Temp 98.4 °F (36.9 °C) (Oral)   Resp 16   Ht 5' 1\" (1.549 m)   Wt 178 lb 14.4 oz (81.1 kg)   LMP 02/06/2015   SpO2 95%   BMI 33.80 kg/m²   Skin: warm and dry, no rash or erythema  Pulmonary/Chest: clear to auscultation bilaterally- no wheezes, rales or rhonchi, normal air movement, no respiratory distress  Cardiovascular: rhythm reg at rate of 64  Abdomen: soft, non-tender, non-distended, normal bowel sounds, no masses or organomegaly  Extremities: no cyanosis, no clubbing and no edema      Recent Labs     06/21/21  0507 06/22/21  0522 06/23/21  0945    140 139   K 4.1 3.7 3.7    103 105   CO2 28 28 27   BUN 10 11 10   CREATININE 0.8 0.9 0.9   GLUCOSE 101* 99 90   CALCIUM 8.9 9.2 9.3       Recent Labs     06/21/21  0507 06/22/21  0522 06/23/21  0945   WBC 5.6 6.1 5.0   RBC 3.91 4.33 4.24   HGB 9.8* 10.5* 10.5*   HCT 32.1* 34.8 35.0   MCV 82.1 80.4 82.5   MCH 25.1* 24.2* 24.8*   MCHC 30.5* 30.2* 30.0*   RDW 16.1* 16.2* 16.6*    253 246   MPV 9.7 9.7 9.8       CBC with Differential:    Lab Results   Component Value Date    WBC 5.0 06/23/2021    RBC 4.24 06/23/2021    HGB 10.5 06/23/2021    HCT 35.0 06/23/2021     06/23/2021    MCV 82.5 06/23/2021    MCH 24.8 06/23/2021    MCHC 30.0 06/23/2021    RDW 16.6 06/23/2021    LYMPHOPCT 19.9 06/23/2021    MONOPCT 7.4 06/23/2021    BASOPCT 0.4 06/23/2021    MONOSABS 0.37 06/23/2021    LYMPHSABS 0.99 06/23/2021    EOSABS 0.19 06/23/2021    BASOSABS 0.02 06/23/2021     BMP:    Lab Results   Component Value Date     06/23/2021    K 3.7 06/23/2021     06/23/2021    CO2 27 06/23/2021    BUN 10 06/23/2021    LABALBU 4.2 06/19/2021    CREATININE 0.9 06/23/2021    CALCIUM 9.3 06/23/2021    GFRAA >60 06/23/2021    LABGLOM >60 06/23/2021    GLUCOSE 90 06/23/2021        Radiology:   CT ABDOMEN PELVIS W IV CONTRAST Additional Contrast? None   Final Result   1. Grossly stable appearance of the acute diverticulitis in the distal   descending colon, with evidence of microperforation. Edema and phlegmon is   seen at the site of the inflammation. No loculated fluid collection to   indicate abscess. 2. 3.2 cm complex appearing left ovarian cyst.  Consider sonographic   evaluation, especially if the patient is postmenopausal.   3. Bilateral nephrolithiasis. No hydronephrosis. 4. Hypodensities in the liver. In the absence of known malignancy or other   risk factors, a benign etiology is favored (cyst or hemangioma). If clinical   concern warrants, follow-up with pre and post-contrast enhanced MRI of the   abdomen may be obtained. IR Interventional Radiology Procedure Request    (Results Pending)   US PELVIS COMPLETE    (Results Pending)       Assessment:    Active Problems:    Acute diverticulitis    Diverticulitis of intestine with perforation and abscess    Hypothyroidism    Essential hypertension    Gastroesophageal reflux disease  Resolved Problems:    * No resolved hospital problems. *      Plan:  1. Diverticulitis with perforation and abscess  Surgery following  continue abx.  IR unable to have abscess drained currently. surgery mentions doing a possible ct scan tomorrow. Pt switched to po pain med  2. Hypothyroidism continue med  3. htn continue med  4. gerd continue med  5. Ovarian cyst noted on ct  Will check u/s. 6. Constipation pt notes that she takes miralax on a semi regular basis at home. Pt not having a bm and will have her give a trial of miralax.          Electronically signed by Freddie Siegel DO on 6/23/2021 at 9:09 PM

## 2021-06-24 NOTE — CARE COORDINATION
Chart reviewed- discussed with Dr. An Urias. Anticipate discharge home with in the next 24 hours on PO abx. Pt is independent- no needs.

## 2021-06-24 NOTE — PROGRESS NOTES
[facility] Hospitalist   Progress Note    Admitting Date and Time: 6/19/2021 10:37 PM  Admit Dx: Acute diverticulitis [K57.92]    Subjective:    Patient was admitted with Acute diverticulitis [K57.92]. Patient denies fever, chills, cp, sob, vomiting. Pt c/o mild nausea. Pt c/o pain.       levothyroxine  125 mcg Oral Daily    metoprolol succinate  25 mg Oral Nightly    pantoprazole  40 mg Oral QAM AC    metroNIDAZOLE  500 mg Intravenous Q8H    sodium chloride flush  10 mL Intravenous 2 times per day    levofloxacin  750 mg Intravenous Q24H     ondansetron, 4 mg, Q6H PRN  oxyCODONE, 5 mg, Q4H PRN  sodium chloride flush, 10 mL, PRN  sodium chloride, 25 mL, PRN  polyethylene glycol, 17 g, Daily PRN  acetaminophen, 650 mg, Q6H PRN   Or  acetaminophen, 650 mg, Q6H PRN         Objective:    BP 88/62   Pulse 69   Temp 98.1 °F (36.7 °C) (Oral)   Resp 16   Ht 5' 1\" (1.549 m)   Wt 178 lb 14.4 oz (81.1 kg)   LMP 02/06/2015   SpO2 97%   BMI 33.80 kg/m²   Skin: warm and dry, no rash or erythema  Pulmonary/Chest: clear to auscultation bilaterally- no wheezes, rales or rhonchi, normal air movement, no respiratory distress  Cardiovascular: rhythm reg at rate of 72  Abdomen: soft, moderately tender diffusely, non-distended, normal bowel sounds, no masses or organomegaly  Extremities: no cyanosis, no clubbing and no edema      Recent Labs     06/22/21  0522 06/23/21  0945 06/24/21  0648    139 140   K 3.7 3.7 4.2    105 106   CO2 28 27 27   BUN 11 10 13   CREATININE 0.9 0.9 0.8   GLUCOSE 99 90 98   CALCIUM 9.2 9.3 8.8       Recent Labs     06/22/21  0522 06/23/21  0945 06/24/21  0648   WBC 6.1 5.0 6.2   RBC 4.33 4.24 4.01   HGB 10.5* 10.5* 9.6*   HCT 34.8 35.0 32.8*   MCV 80.4 82.5 81.8   MCH 24.2* 24.8* 23.9*   MCHC 30.2* 30.0* 29.3*   RDW 16.2* 16.6* 16.5*    246 243   MPV 9.7 9.8 9.9       CBC with Differential:    Lab Results   Component Value Date    WBC 6.2 06/24/2021    RBC 4.01 06/24/2021    HGB 9.6 06/24/2021    HCT 32.8 06/24/2021     06/24/2021    MCV 81.8 06/24/2021    MCH 23.9 06/24/2021    MCHC 29.3 06/24/2021    RDW 16.5 06/24/2021    LYMPHOPCT 19.3 06/24/2021    MONOPCT 8.5 06/24/2021    BASOPCT 0.5 06/24/2021    MONOSABS 0.53 06/24/2021    LYMPHSABS 1.20 06/24/2021    EOSABS 0.23 06/24/2021    BASOSABS 0.03 06/24/2021     BMP:    Lab Results   Component Value Date     06/24/2021    K 4.2 06/24/2021     06/24/2021    CO2 27 06/24/2021    BUN 13 06/24/2021    LABALBU 4.2 06/19/2021    CREATININE 0.8 06/24/2021    CALCIUM 8.8 06/24/2021    GFRAA >60 06/24/2021    LABGLOM >60 06/24/2021    GLUCOSE 98 06/24/2021        Radiology:   US PELVIS COMPLETE   Final Result   1. Incompletely characterized left ovarian lesion which measures 4.7 cm. This is mixed echogenicity on this exam.      2.  Normal appearance of the uterus, endometrium, and right ovary. No right   adnexal mass. Normal right ovarian vascularity. RECOMMENDATIONS:   Recommend pelvic MRI with and without gadolinium for complete   characterization of the left ovarian lesion. CT ABDOMEN PELVIS W IV CONTRAST Additional Contrast? None   Final Result   1. Grossly stable appearance of the acute diverticulitis in the distal   descending colon, with evidence of microperforation. Edema and phlegmon is   seen at the site of the inflammation. No loculated fluid collection to   indicate abscess. 2. 3.2 cm complex appearing left ovarian cyst.  Consider sonographic   evaluation, especially if the patient is postmenopausal.   3. Bilateral nephrolithiasis. No hydronephrosis. 4. Hypodensities in the liver. In the absence of known malignancy or other   risk factors, a benign etiology is favored (cyst or hemangioma). If clinical   concern warrants, follow-up with pre and post-contrast enhanced MRI of the   abdomen may be obtained.          IR Interventional Radiology Procedure Request    (Results Pending)       Assessment:    Active Problems:    Acute diverticulitis    Diverticulitis of intestine with perforation and abscess    Hypothyroidism    Essential hypertension    Gastroesophageal reflux disease  Resolved Problems:    * No resolved hospital problems. *      Plan:  1. Diverticulitis with perforation and abscess  Surgery following  continue abx. IR unable to have abscess drained currently. Pt switched to po pain med  2. Hypothyroidism continue med  3. htn continue med  4. gerd continue med  5. Ovarian cyst noted on ct  Will check u/s. 6. Constipation pt had small bm with miralax    Pt wanting to go home. Discussed with her about u/s. Pt stating that she wanted to go as an outpt. While going over discharge instructions, observed that pt appeared to be in more pain than what she was reporting to me and, with further questioning, pt states that she is worried about her hospital bill and wants to go home. Discussed with her that would observe her for next 24 hours and try her on po abx to make sure she tolerates po abx and pain med. Pt states that she will stay and do u/s as well. Addendum: Ct scan recommending u/s, now u/s recommending mri. Will ask gyn for further input before ordering further testing.          Electronically signed by Chelsea Coto DO on 6/24/2021 at 6:05 PM

## 2021-06-24 NOTE — PROGRESS NOTES
Drainage cath request reviewed. CT ABD/pel  showed no significant change in diverticular abscess.  No defined abscess for perc drainage.    -cont abx per primary team      Wayne Riley MD  Vascular and Interventional Radiology ([facility])    Daytime direct number to [phone]: [address]:      [facility] Core: [phone]  Outpatient IR [phone]

## 2021-06-24 NOTE — PROGRESS NOTES
GENERAL SURGERY  DAILY PROGRESS NOTE  6/24/2021  CC: abdominal pain    Subjective:  Still mild pain, but better than yesterday. No N/V. Tolerating diet. Wants to go home. Objective:  /73   Pulse 67   Temp 98.4 °F (36.9 °C) (Oral)   Resp 16   Ht 5' 1\" (1.549 m)   Wt 178 lb 14.4 oz (81.1 kg)   LMP 02/06/2015   SpO2 95%   BMI 33.80 kg/m²     GENERAL:  Laying in bed, awake, alert, cooperative, no apparent distress  HEAD: Normocephalic, atraumatic  EYES: No sclera icterus, pupils equal  LUNGS:  No increased work of breathing  CARDIOVASCULAR:  RR and normotensive  ABDOMEN:  Soft, mild TTP LLQ, non-distended  EXTREMITIES: No edema or swelling  SKIN: Warm and dry    Assessment/Plan:  48 y.o. female with perforated diverticulitis with abscess formation. Repeat CT A/P 6/23 shows stable diverticulitis with no formed abscess    - Okay for diet as tolerated  - Will discuss switching IV abx to oral vs continuing IV abx outpatient  - Pain control PRN  - Poss DC 6/24 vs 6/25  - Will need to follow up with Dr. Darrel Juarez as an outpatient  - Will discuss with Dr. Darrel Juarez    Electronically signed by Roverto Blanton MD on 6/24/2021 at 5:59 AM     Overall, the patient is doing well. The patient continues to experience left lower quadrant abdominal tenderness. The CT of the abdomen and pelvis did not reveal an abscess. The patient is afebrile and has a normal white blood cell count. The patient will be transition to oral antibiotics for a course of 14 days. The patient will maintain a low residue diet.

## 2021-06-24 NOTE — PLAN OF CARE
Problem: Pain:  Goal: Pain level will decrease  Description: Pain level will decrease  6/24/2021 0132 by Wood Moran RN  Outcome: Ongoing  6/23/2021 1816 by Payton Ireland RN  Outcome: Met This Shift  Goal: Control of acute pain  Description: Control of acute pain  6/24/2021 0132 by Wood Moran RN  Outcome: Ongoing  6/23/2021 1816 by Payton Ireland RN  Outcome: Met This Shift  Goal: Control of chronic pain  Description: Control of chronic pain  6/24/2021 0132 by Wood Moran RN  Outcome: Ongoing  6/23/2021 1816 by Payton Ireland RN  Outcome: Met This Shift     Problem: Falls - Risk of:  Goal: Will remain free from falls  Description: Will remain free from falls  6/24/2021 0132 by Wood Moran RN  Outcome: Ongoing  6/23/2021 1816 by Payton Ireland RN  Outcome: Met This Shift  Goal: Absence of physical injury  Description: Absence of physical injury  6/24/2021 0132 by Wood Moran RN  Outcome: Ongoing  6/23/2021 1816 by Payton Ireland RN  Outcome: Met This Shift

## 2021-06-25 VITALS
RESPIRATION RATE: 16 BRPM | SYSTOLIC BLOOD PRESSURE: 120 MMHG | OXYGEN SATURATION: 94 % | BODY MASS INDEX: 33.78 KG/M2 | HEIGHT: 61 IN | WEIGHT: 178.9 LBS | DIASTOLIC BLOOD PRESSURE: 74 MMHG | HEART RATE: 68 BPM | TEMPERATURE: 98.5 F

## 2021-06-25 LAB
ANION GAP SERPL CALCULATED.3IONS-SCNC: 7 MMOL/L (ref 7–16)
BASOPHILS ABSOLUTE: 0.02 E9/L (ref 0–0.2)
BASOPHILS RELATIVE PERCENT: 0.3 % (ref 0–2)
BLOOD CULTURE, ROUTINE: NORMAL
BUN BLDV-MCNC: 13 MG/DL (ref 6–20)
CALCIUM SERPL-MCNC: 8.9 MG/DL (ref 8.6–10.2)
CHLORIDE BLD-SCNC: 103 MMOL/L (ref 98–107)
CO2: 28 MMOL/L (ref 22–29)
CREAT SERPL-MCNC: 0.8 MG/DL (ref 0.5–1)
CULTURE, BLOOD 2: NORMAL
EOSINOPHILS ABSOLUTE: 0.23 E9/L (ref 0.05–0.5)
EOSINOPHILS RELATIVE PERCENT: 3.6 % (ref 0–6)
GFR AFRICAN AMERICAN: >60
GFR NON-AFRICAN AMERICAN: >60 ML/MIN/1.73
GLUCOSE BLD-MCNC: 102 MG/DL (ref 74–99)
HCT VFR BLD CALC: 34 % (ref 34–48)
HEMOGLOBIN: 10.1 G/DL (ref 11.5–15.5)
IMMATURE GRANULOCYTES #: 0.02 E9/L
IMMATURE GRANULOCYTES %: 0.3 % (ref 0–5)
LYMPHOCYTES ABSOLUTE: 1.37 E9/L (ref 1.5–4)
LYMPHOCYTES RELATIVE PERCENT: 21.7 % (ref 20–42)
MCH RBC QN AUTO: 24.3 PG (ref 26–35)
MCHC RBC AUTO-ENTMCNC: 29.7 % (ref 32–34.5)
MCV RBC AUTO: 81.7 FL (ref 80–99.9)
MONOCYTES ABSOLUTE: 0.59 E9/L (ref 0.1–0.95)
MONOCYTES RELATIVE PERCENT: 9.4 % (ref 2–12)
NEUTROPHILS ABSOLUTE: 4.08 E9/L (ref 1.8–7.3)
NEUTROPHILS RELATIVE PERCENT: 64.7 % (ref 43–80)
PDW BLD-RTO: 16.4 FL (ref 11.5–15)
PLATELET # BLD: 243 E9/L (ref 130–450)
PMV BLD AUTO: 9.8 FL (ref 7–12)
POTASSIUM REFLEX MAGNESIUM: 3.9 MMOL/L (ref 3.5–5)
RBC # BLD: 4.16 E12/L (ref 3.5–5.5)
SODIUM BLD-SCNC: 138 MMOL/L (ref 132–146)
WBC # BLD: 6.3 E9/L (ref 4.5–11.5)

## 2021-06-25 PROCEDURE — 6360000002 HC RX W HCPCS: Performed by: INTERNAL MEDICINE

## 2021-06-25 PROCEDURE — 80048 BASIC METABOLIC PNL TOTAL CA: CPT

## 2021-06-25 PROCEDURE — 6370000000 HC RX 637 (ALT 250 FOR IP): Performed by: INTERNAL MEDICINE

## 2021-06-25 PROCEDURE — 6370000000 HC RX 637 (ALT 250 FOR IP): Performed by: STUDENT IN AN ORGANIZED HEALTH CARE EDUCATION/TRAINING PROGRAM

## 2021-06-25 PROCEDURE — 36415 COLL VENOUS BLD VENIPUNCTURE: CPT

## 2021-06-25 PROCEDURE — 2500000003 HC RX 250 WO HCPCS: Performed by: INTERNAL MEDICINE

## 2021-06-25 PROCEDURE — 85025 COMPLETE CBC W/AUTO DIFF WBC: CPT

## 2021-06-25 PROCEDURE — 99239 HOSP IP/OBS DSCHRG MGMT >30: CPT | Performed by: INTERNAL MEDICINE

## 2021-06-25 PROCEDURE — 2580000003 HC RX 258: Performed by: INTERNAL MEDICINE

## 2021-06-25 RX ORDER — METRONIDAZOLE 500 MG/1
500 TABLET ORAL 3 TIMES DAILY
Qty: 30 TABLET | Refills: 0 | Status: SHIPPED | OUTPATIENT
Start: 2021-06-25 | End: 2021-07-05

## 2021-06-25 RX ORDER — OXYCODONE HYDROCHLORIDE 5 MG/1
5 TABLET ORAL EVERY 6 HOURS PRN
Qty: 12 TABLET | Refills: 0 | Status: SHIPPED | OUTPATIENT
Start: 2021-06-25 | End: 2021-06-28

## 2021-06-25 RX ORDER — CEFDINIR 300 MG/1
300 CAPSULE ORAL 2 TIMES DAILY
Qty: 20 CAPSULE | Refills: 0 | Status: SHIPPED | OUTPATIENT
Start: 2021-06-25 | End: 2021-07-05

## 2021-06-25 RX ADMIN — LEVOTHYROXINE SODIUM 125 MCG: 125 TABLET ORAL at 06:39

## 2021-06-25 RX ADMIN — OXYCODONE HYDROCHLORIDE 5 MG: 5 TABLET ORAL at 06:38

## 2021-06-25 RX ADMIN — LEVOFLOXACIN 750 MG: 5 INJECTION, SOLUTION INTRAVENOUS at 09:16

## 2021-06-25 RX ADMIN — METRONIDAZOLE 500 MG: 500 INJECTION, SOLUTION INTRAVENOUS at 06:39

## 2021-06-25 RX ADMIN — PANTOPRAZOLE SODIUM 40 MG: 40 TABLET, DELAYED RELEASE ORAL at 06:39

## 2021-06-25 RX ADMIN — Medication 10 ML: at 08:53

## 2021-06-25 RX ADMIN — OXYCODONE HYDROCHLORIDE 5 MG: 5 TABLET ORAL at 12:07

## 2021-06-25 ASSESSMENT — PAIN DESCRIPTION - PROGRESSION: CLINICAL_PROGRESSION: NOT CHANGED

## 2021-06-25 ASSESSMENT — PAIN SCALES - GENERAL
PAINLEVEL_OUTOF10: 2
PAINLEVEL_OUTOF10: 6
PAINLEVEL_OUTOF10: 7

## 2021-06-25 NOTE — PROGRESS NOTES
Nutrition Note    Pt adm for Acute diverticulitis complicated by microperforation and abscess. Pt having no nausea or vomiting. After reviewing EMR pt is low risk nutritionally. No wt loss, poor appetite or PO. Current PO intake at % per pt and nursing.  (No consult or trigger just LOS)    Electronically signed by Corrina Frey RD, LD on 6/25/21 at 2:12 PM EDT    Contact: [phone]

## 2021-06-25 NOTE — PROGRESS NOTES
GENERAL SURGERY  DAILY PROGRESS NOTE  6/25/2021  CC: abdominal pain    Subjective:  Pain continues to improve. Tolerating diet. Wants to go home    Objective:  /74   Pulse 68   Temp 98.5 °F (36.9 °C) (Oral)   Resp 16   Ht 5' 1\" (1.549 m)   Wt 178 lb 14.4 oz (81.1 kg)   LMP 02/06/2015   SpO2 94%   BMI 33.80 kg/m²     GENERAL:  Laying in bed, awake, alert, cooperative, no apparent distress  HEAD: Normocephalic, atraumatic  EYES: No sclera icterus, pupils equal  LUNGS:  No increased work of breathing  CARDIOVASCULAR:  RR and normotensive  ABDOMEN:  Soft, mild TTP LLQ, non-distended  EXTREMITIES: No edema or swelling  SKIN: Warm and dry    Assessment/Plan:  48 y.o. female with perforated diverticulitis with abscess formation. Repeat CT A/P 6/23 shows stable diverticulitis with no formed abscess    - continue diet  - ok to discharge today on oral antibiotics  - follow up with Dr. Vicki Becerra    Electronically signed by Alia Guerrero MD on 6/25/2021 at 8:41 AM     Patient was seen and examined and the chart was reviewed. I agree with the assessment and plan. The patient will be discharged on oral antibiotics. The patient will follow up in 7 to 10 days.

## 2021-06-25 NOTE — PLAN OF CARE
Problem: Pain:  Goal: Pain level will decrease  Description: Pain level will decrease  6/25/2021 1222 by Ana Lilia Villagran RN  Outcome: Met This Shift  6/25/2021 0250 by Barak Barrera RN  Outcome: Ongoing  Goal: Control of acute pain  Description: Control of acute pain  6/25/2021 1222 by Ana Lilia Villagran RN  Outcome: Met This Shift  6/25/2021 0250 by Barak Barrera RN  Outcome: Ongoing  Goal: Control of chronic pain  Description: Control of chronic pain  6/25/2021 1222 by Ana Lilia Villagran RN  Outcome: Met This Shift  6/25/2021 0250 by Barak Barrera RN  Outcome: Ongoing     Problem: Falls - Risk of:  Goal: Will remain free from falls  Description: Will remain free from falls  Outcome: Met This Shift  Goal: Absence of physical injury  Description: Absence of physical injury  Outcome: Met This Shift

## 2021-08-06 ENCOUNTER — APPOINTMENT (OUTPATIENT)
Dept: CT IMAGING | Age: 51
End: 2021-08-06
Payer: COMMERCIAL

## 2021-08-06 ENCOUNTER — APPOINTMENT (OUTPATIENT)
Dept: ULTRASOUND IMAGING | Age: 51
End: 2021-08-06
Payer: COMMERCIAL

## 2021-08-06 ENCOUNTER — HOSPITAL ENCOUNTER (OUTPATIENT)
Age: 51
Setting detail: OBSERVATION
Discharge: HOME | End: 2021-08-09
Attending: STUDENT IN AN ORGANIZED HEALTH CARE EDUCATION/TRAINING PROGRAM | Admitting: INTERNAL MEDICINE
Payer: COMMERCIAL

## 2021-08-06 DIAGNOSIS — K57.92 ACUTE DIVERTICULITIS: Primary | ICD-10-CM

## 2021-08-06 PROBLEM — N83.209 OVARIAN CYST: Status: ACTIVE | Noted: 2021-08-06

## 2021-08-06 LAB
ALBUMIN SERPL-MCNC: 4.1 G/DL (ref 3.5–5.2)
ALP BLD-CCNC: 122 U/L (ref 35–104)
ALT SERPL-CCNC: 13 U/L (ref 0–32)
ANION GAP SERPL CALCULATED.3IONS-SCNC: 10 MMOL/L (ref 7–16)
AST SERPL-CCNC: 17 U/L (ref 0–31)
BASOPHILS ABSOLUTE: 0.02 E9/L (ref 0–0.2)
BASOPHILS RELATIVE PERCENT: 0.3 % (ref 0–2)
BILIRUB SERPL-MCNC: 0.6 MG/DL (ref 0–1.2)
BILIRUBIN URINE: NEGATIVE
BLOOD, URINE: NEGATIVE
BUN BLDV-MCNC: 11 MG/DL (ref 6–20)
CALCIUM SERPL-MCNC: 8.9 MG/DL (ref 8.6–10.2)
CHLORIDE BLD-SCNC: 103 MMOL/L (ref 98–107)
CLARITY: CLEAR
CO2: 26 MMOL/L (ref 22–29)
COLOR: YELLOW
CREAT SERPL-MCNC: 0.7 MG/DL (ref 0.5–1)
EOSINOPHILS ABSOLUTE: 0.05 E9/L (ref 0.05–0.5)
EOSINOPHILS RELATIVE PERCENT: 0.8 % (ref 0–6)
GFR AFRICAN AMERICAN: >60
GFR NON-AFRICAN AMERICAN: >60 ML/MIN/1.73
GLUCOSE BLD-MCNC: 99 MG/DL (ref 74–99)
GLUCOSE URINE: NEGATIVE MG/DL
HCT VFR BLD CALC: 33.8 % (ref 34–48)
HEMOGLOBIN: 10.2 G/DL (ref 11.5–15.5)
IMMATURE GRANULOCYTES #: 0.01 E9/L
IMMATURE GRANULOCYTES %: 0.2 % (ref 0–5)
KETONES, URINE: 15 MG/DL
LACTIC ACID: 0.7 MMOL/L (ref 0.5–2.2)
LEUKOCYTE ESTERASE, URINE: NEGATIVE
LIPASE: 21 U/L (ref 13–60)
LYMPHOCYTES ABSOLUTE: 0.84 E9/L (ref 1.5–4)
LYMPHOCYTES RELATIVE PERCENT: 12.7 % (ref 20–42)
MCH RBC QN AUTO: 24.1 PG (ref 26–35)
MCHC RBC AUTO-ENTMCNC: 30.2 % (ref 32–34.5)
MCV RBC AUTO: 79.9 FL (ref 80–99.9)
MONOCYTES ABSOLUTE: 0.33 E9/L (ref 0.1–0.95)
MONOCYTES RELATIVE PERCENT: 5 % (ref 2–12)
NEUTROPHILS ABSOLUTE: 5.35 E9/L (ref 1.8–7.3)
NEUTROPHILS RELATIVE PERCENT: 81 % (ref 43–80)
NITRITE, URINE: NEGATIVE
PDW BLD-RTO: 15.7 FL (ref 11.5–15)
PH UA: 6.5 (ref 5–9)
PLATELET # BLD: 254 E9/L (ref 130–450)
PMV BLD AUTO: 9.9 FL (ref 7–12)
POTASSIUM REFLEX MAGNESIUM: 3.7 MMOL/L (ref 3.5–5)
PROTEIN UA: NEGATIVE MG/DL
RBC # BLD: 4.23 E12/L (ref 3.5–5.5)
SODIUM BLD-SCNC: 139 MMOL/L (ref 132–146)
SPECIFIC GRAVITY UA: <=1.005 (ref 1–1.03)
TOTAL PROTEIN: 7 G/DL (ref 6.4–8.3)
TROPONIN, HIGH SENSITIVITY: 9 NG/L (ref 0–9)
UROBILINOGEN, URINE: 0.2 E.U./DL
WBC # BLD: 6.6 E9/L (ref 4.5–11.5)

## 2021-08-06 PROCEDURE — 96367 TX/PROPH/DG ADDL SEQ IV INF: CPT

## 2021-08-06 PROCEDURE — 84484 ASSAY OF TROPONIN QUANT: CPT

## 2021-08-06 PROCEDURE — 2580000003 HC RX 258: Performed by: STUDENT IN AN ORGANIZED HEALTH CARE EDUCATION/TRAINING PROGRAM

## 2021-08-06 PROCEDURE — 96375 TX/PRO/DX INJ NEW DRUG ADDON: CPT

## 2021-08-06 PROCEDURE — 96376 TX/PRO/DX INJ SAME DRUG ADON: CPT

## 2021-08-06 PROCEDURE — 87088 URINE BACTERIA CULTURE: CPT

## 2021-08-06 PROCEDURE — 87040 BLOOD CULTURE FOR BACTERIA: CPT

## 2021-08-06 PROCEDURE — 74177 CT ABD & PELVIS W/CONTRAST: CPT

## 2021-08-06 PROCEDURE — 80053 COMPREHEN METABOLIC PANEL: CPT

## 2021-08-06 PROCEDURE — 6360000004 HC RX CONTRAST MEDICATION: Performed by: RADIOLOGY

## 2021-08-06 PROCEDURE — 96361 HYDRATE IV INFUSION ADD-ON: CPT

## 2021-08-06 PROCEDURE — G0378 HOSPITAL OBSERVATION PER HR: HCPCS

## 2021-08-06 PROCEDURE — 96365 THER/PROPH/DIAG IV INF INIT: CPT

## 2021-08-06 PROCEDURE — 93975 VASCULAR STUDY: CPT

## 2021-08-06 PROCEDURE — 83605 ASSAY OF LACTIC ACID: CPT

## 2021-08-06 PROCEDURE — 76830 TRANSVAGINAL US NON-OB: CPT

## 2021-08-06 PROCEDURE — 99285 EMERGENCY DEPT VISIT HI MDM: CPT

## 2021-08-06 PROCEDURE — 81003 URINALYSIS AUTO W/O SCOPE: CPT

## 2021-08-06 PROCEDURE — 83690 ASSAY OF LIPASE: CPT

## 2021-08-06 PROCEDURE — 85025 COMPLETE CBC W/AUTO DIFF WBC: CPT

## 2021-08-06 PROCEDURE — 6360000002 HC RX W HCPCS: Performed by: STUDENT IN AN ORGANIZED HEALTH CARE EDUCATION/TRAINING PROGRAM

## 2021-08-06 PROCEDURE — 2500000003 HC RX 250 WO HCPCS: Performed by: STUDENT IN AN ORGANIZED HEALTH CARE EDUCATION/TRAINING PROGRAM

## 2021-08-06 PROCEDURE — 99220 PR INITIAL OBSERVATION CARE/DAY 70 MINUTES: CPT | Performed by: INTERNAL MEDICINE

## 2021-08-06 RX ORDER — FENTANYL CITRATE 50 UG/ML
50 INJECTION, SOLUTION INTRAMUSCULAR; INTRAVENOUS ONCE
Status: COMPLETED | OUTPATIENT
Start: 2021-08-06 | End: 2021-08-06

## 2021-08-06 RX ORDER — ONDANSETRON 2 MG/ML
8 INJECTION INTRAMUSCULAR; INTRAVENOUS ONCE
Status: COMPLETED | OUTPATIENT
Start: 2021-08-06 | End: 2021-08-06

## 2021-08-06 RX ORDER — 0.9 % SODIUM CHLORIDE 0.9 %
30 INTRAVENOUS SOLUTION INTRAVENOUS ONCE
Status: COMPLETED | OUTPATIENT
Start: 2021-08-06 | End: 2021-08-06

## 2021-08-06 RX ORDER — CIPROFLOXACIN 2 MG/ML
400 INJECTION, SOLUTION INTRAVENOUS ONCE
Status: COMPLETED | OUTPATIENT
Start: 2021-08-06 | End: 2021-08-06

## 2021-08-06 RX ADMIN — FENTANYL CITRATE 50 MCG: 50 INJECTION, SOLUTION INTRAMUSCULAR; INTRAVENOUS at 20:28

## 2021-08-06 RX ADMIN — ONDANSETRON 8 MG: 2 INJECTION INTRAMUSCULAR; INTRAVENOUS at 18:09

## 2021-08-06 RX ADMIN — CIPROFLOXACIN 400 MG: 2 INJECTION, SOLUTION INTRAVENOUS at 20:58

## 2021-08-06 RX ADMIN — SODIUM CHLORIDE 1000 ML: 9 INJECTION, SOLUTION INTRAVENOUS at 18:09

## 2021-08-06 RX ADMIN — FENTANYL CITRATE 50 MCG: 50 INJECTION, SOLUTION INTRAMUSCULAR; INTRAVENOUS at 18:10

## 2021-08-06 RX ADMIN — IOPAMIDOL 75 ML: 755 INJECTION, SOLUTION INTRAVENOUS at 18:36

## 2021-08-06 RX ADMIN — METRONIDAZOLE 500 MG: 500 INJECTION, SOLUTION INTRAVENOUS at 22:19

## 2021-08-06 ASSESSMENT — ENCOUNTER SYMPTOMS
BACK PAIN: 0
TROUBLE SWALLOWING: 0
EYE PAIN: 0
DIARRHEA: 1
BLOOD IN STOOL: 0
WHEEZING: 0
ABDOMINAL PAIN: 1
COUGH: 0
SORE THROAT: 0
SINUS PAIN: 0
SHORTNESS OF BREATH: 0
RHINORRHEA: 0
VOMITING: 1

## 2021-08-06 ASSESSMENT — PAIN DESCRIPTION - LOCATION: LOCATION: ABDOMEN

## 2021-08-06 ASSESSMENT — PAIN SCALES - GENERAL: PAINLEVEL_OUTOF10: 10

## 2021-08-06 ASSESSMENT — PAIN DESCRIPTION - PAIN TYPE: TYPE: ACUTE PAIN

## 2021-08-06 NOTE — ED NOTES
FIRST PROVIDER CONTACT ASSESSMENT NOTE       Department of Emergency Medicine   21  3:42 PM EDT    Chief Complaint: Abdominal Pain (LLQ radiating to right x 2 hrs, finished atb for diverticulitis w abscess a couple weeks ago, had CT yesterday), Rectal Bleeding (dark stools ), and Emesis (x 2 today )    History of Present Illness:   Bryant Degroot is a 46 y.o. female who presents to the ED for left lower quadrant abdominal pain that has been worsening over the past 2 hours. Patient states she does have a history of diverticulitis with abscess and just finished her antibiotics for this. She states she also had 2 episodes of emesis today as well as dark stools. Focused Physical Exam:  VS:  BP (!) 141/83   Pulse 82   Temp 98.3 °F (36.8 °C) (Temporal)   Resp 16   Ht 5' 1\" (1.549 m)   Wt 178 lb (80.7 kg)   LMP 2015   SpO2 98%   BMI 33.63 kg/m²      General: Alert and in no apparent distress     Medical History:  has a past medical history of GERD (gastroesophageal reflux disease), Hypertension, and Thyroid disease. Surgical History:  has a past surgical history that includes Breast surgery and  section. Social History:  reports that she has never smoked. She has never used smokeless tobacco. She reports current alcohol use. She reports that she does not use drugs. Family History: family history is not on file.     *ALLERGIES*     Pcn [penicillins]     Initial Plan of Care:  Initiate Treatment-Testing, Proceed toTreatment Area When Bed Available for ED Attending/MLP to Continue Care    -----------------END OF FIRST PROVIDER CONTACT ASSESSMENT NOTE--------------  Electronically signed by Jcarlos Mary PA-C   DD: 21         Jcarlos Mary PA-C  21 Orquidea Castro PA-C  21 [id number]

## 2021-08-06 NOTE — ED PROVIDER NOTES
The patient is a 49-year-old female with a past medical history of diverticulitis diagnosed in June presents today with complaints of left lower quadrant abdominal pain associated with nausea vomiting and diarrhea onset 2 days ago. The patient symptoms were sudden in onset 2 days ago, has been persistent, moderate in severity, and nothing makes it better or worse. She is concerned today because the pain in her left lower quadrant has migrated to her right lower quadrant. Nothing makes it better or worse. She describes as an aching sensation. She sees Dr. Trev Flores for management of her chronic diverticulitis. She was hospitalized in June for treatment with IV antibiotics and was given outpatient antibiotics at home. She denies any blood in her stool. She has never had a colonoscopy. Review of Systems   Constitutional: Negative for diaphoresis and fever. HENT: Negative for ear pain, hearing loss, rhinorrhea, sinus pain, sore throat and trouble swallowing. Eyes: Negative for pain. Respiratory: Negative for cough, shortness of breath and wheezing. Cardiovascular: Negative for chest pain and palpitations. Gastrointestinal: Positive for abdominal pain, diarrhea, nausea and vomiting. Negative for blood in stool. Endocrine: Negative for polyuria. Genitourinary: Negative for flank pain, frequency, hematuria and urgency. Musculoskeletal: Negative for back pain, neck pain and neck stiffness. Neurological: Negative for dizziness, speech difficulty, weakness, light-headedness and numbness. Psychiatric/Behavioral: Negative for confusion. The patient is not nervous/anxious. Physical Exam  Constitutional:       General: She is not in acute distress. Appearance: Normal appearance. She is not ill-appearing, toxic-appearing or diaphoretic. HENT:      Head: Normocephalic and atraumatic. Nose: No rhinorrhea. Eyes:      General: No scleral icterus.      Extraocular Movements: Extraocular movements intact. Pupils: Pupils are equal, round, and reactive to light. Cardiovascular:      Heart sounds: Normal heart sounds. No murmur heard. No friction rub. No gallop. Pulmonary:      Breath sounds: Normal breath sounds. No wheezing, rhonchi or rales. Abdominal:      Palpations: Abdomen is soft. Tenderness: There is abdominal tenderness in the right lower quadrant and left lower quadrant. There is no guarding or rebound. Negative signs include Love's sign and McBurney's sign. Musculoskeletal:         General: No swelling. Cervical back: Normal range of motion. No rigidity or tenderness. Right lower leg: No edema. Left lower leg: No edema. Lymphadenopathy:      Cervical: No cervical adenopathy. Skin:     General: Skin is warm and dry. Coloration: Skin is not jaundiced. Neurological:      General: No focal deficit present. Mental Status: She is alert and oriented to person, place, and time. Cranial Nerves: No cranial nerve deficit. Sensory: No sensory deficit. Motor: No weakness. Psychiatric:         Mood and Affect: Mood normal.         Behavior: Behavior normal.         Thought Content: Thought content normal.         Judgment: Judgment normal.          Procedures     MDM  Number of Diagnoses or Management Options  Diagnosis management comments: This is a 78-year-old female who presents today with medical history of diverticulitis who presents today with complaints of left lower quadrant pain associated with nausea and vomiting and diarrhea. She is concerned today because her pain has migrated to the right lower quadrant. She sees Dr. Shakir Manzanares regularly for management of her Crohn's. She has never had a colonoscopy before. On exam she is mildly hypertensive has tenderness to palpation in both the left lower quadrant and the right lower quadrant. IV fluids were started, nausea control with Zofran and pain control with fentanyl. [penicillins]    -------------------------------------------------- RESULTS -------------------------------------------------    LABS:  Results for orders placed or performed during the hospital encounter of 08/06/21   Culture, Urine    Specimen: Urine, clean catch   Result Value Ref Range    Urine Culture, Routine Growth not present    Culture, Blood 2    Specimen: Blood   Result Value Ref Range    Culture, Blood 2 24 Hours no growth    Culture, Blood 1    Specimen: Blood   Result Value Ref Range    Blood Culture, Routine 24 Hours no growth    CBC Auto Differential   Result Value Ref Range    WBC 6.6 4.5 - 11.5 E9/L    RBC 4.23 3.50 - 5.50 E12/L    Hemoglobin 10.2 (L) 11.5 - 15.5 g/dL    Hematocrit 33.8 (L) 34.0 - 48.0 %    MCV 79.9 (L) 80.0 - 99.9 fL    MCH 24.1 (L) 26.0 - 35.0 pg    MCHC 30.2 (L) 32.0 - 34.5 %    RDW 15.7 (H) 11.5 - 15.0 fL    Platelets 367 444 - 141 E9/L    MPV 9.9 7.0 - 12.0 fL    Neutrophils % 81.0 (H) 43.0 - 80.0 %    Immature Granulocytes % 0.2 0.0 - 5.0 %    Lymphocytes % 12.7 (L) 20.0 - 42.0 %    Monocytes % 5.0 2.0 - 12.0 %    Eosinophils % 0.8 0.0 - 6.0 %    Basophils % 0.3 0.0 - 2.0 %    Neutrophils Absolute 5.35 1.80 - 7.30 E9/L    Immature Granulocytes # 0.01 E9/L    Lymphocytes Absolute 0.84 (L) 1.50 - 4.00 E9/L    Monocytes Absolute 0.33 0.10 - 0.95 E9/L    Eosinophils Absolute 0.05 0.05 - 0.50 E9/L    Basophils Absolute 0.02 0.00 - 0.20 E9/L   Comprehensive Metabolic Panel w/ Reflex to MG   Result Value Ref Range    Sodium 139 132 - 146 mmol/L    Potassium reflex Magnesium 3.7 3.5 - 5.0 mmol/L    Chloride 103 98 - 107 mmol/L    CO2 26 22 - 29 mmol/L    Anion Gap 10 7 - 16 mmol/L    Glucose 99 74 - 99 mg/dL    BUN 11 6 - 20 mg/dL    CREATININE 0.7 0.5 - 1.0 mg/dL    GFR Non-African American >60 >=60 mL/min/1.73    GFR African American >60     Calcium 8.9 8.6 - 10.2 mg/dL    Total Protein 7.0 6.4 - 8.3 g/dL    Albumin 4.1 3.5 - 5.2 g/dL    Total Bilirubin 0.6 0.0 - 1.2 mg/dL Alkaline Phosphatase 122 (H) 35 - 104 U/L    ALT 13 0 - 32 U/L    AST 17 0 - 31 U/L   Lipase   Result Value Ref Range    Lipase 21 13 - 60 U/L   Lactic Acid, Plasma   Result Value Ref Range    Lactic Acid 0.7 0.5 - 2.2 mmol/L   Troponin   Result Value Ref Range    Troponin, High Sensitivity 9 0 - 9 ng/L   Urinalysis   Result Value Ref Range    Color, UA Yellow Straw/Yellow    Clarity, UA Clear Clear    Glucose, Ur Negative Negative mg/dL    Bilirubin Urine Negative Negative    Ketones, Urine 15 (A) Negative mg/dL    Specific Gravity, UA <=1.005 1.005 - 1.030    Blood, Urine Negative Negative    pH, UA 6.5 5.0 - 9.0    Protein, UA Negative Negative mg/dL    Urobilinogen, Urine 0.2 <2.0 E.U./dL    Nitrite, Urine Negative Negative    Leukocyte Esterase, Urine Negative Negative   Basic Metabolic Panel w/ Reflex to MG   Result Value Ref Range    Sodium 139 132 - 146 mmol/L    Potassium reflex Magnesium 3.7 3.5 - 5.0 mmol/L    Chloride 106 98 - 107 mmol/L    CO2 28 22 - 29 mmol/L    Anion Gap 5 (L) 7 - 16 mmol/L    Glucose 111 (H) 74 - 99 mg/dL    BUN 7 6 - 20 mg/dL    CREATININE 0.8 0.5 - 1.0 mg/dL    GFR Non-African American >60 >=60 mL/min/1.73    GFR African American >60     Calcium 8.7 8.6 - 10.2 mg/dL   CBC auto differential   Result Value Ref Range    WBC 4.9 4.5 - 11.5 E9/L    RBC 3.74 3.50 - 5.50 E12/L    Hemoglobin 9.0 (L) 11.5 - 15.5 g/dL    Hematocrit 30.3 (L) 34.0 - 48.0 %    MCV 81.0 80.0 - 99.9 fL    MCH 24.1 (L) 26.0 - 35.0 pg    MCHC 29.7 (L) 32.0 - 34.5 %    RDW 15.9 (H) 11.5 - 15.0 fL    Platelets 785 170 - 818 E9/L    MPV 10.2 7.0 - 12.0 fL    Neutrophils % 65.3 43.0 - 80.0 %    Immature Granulocytes % 0.4 0.0 - 5.0 %    Lymphocytes % 22.7 20.0 - 42.0 %    Monocytes % 9.6 2.0 - 12.0 %    Eosinophils % 1.6 0.0 - 6.0 %    Basophils % 0.4 0.0 - 2.0 %    Neutrophils Absolute 3.18 1.80 - 7.30 E9/L    Immature Granulocytes # 0.02 E9/L    Lymphocytes Absolute 1.11 (L) 1.50 - 4.00 E9/L    Monocytes Absolute 0. 47 0.10 - 0.95 E9/L    Eosinophils Absolute 0.08 0.05 - 0.50 E9/L    Basophils Absolute 0.02 0.00 - 0.20 E9/L      Result Value Ref Range     41.4 (H) 0.0 - 35.0 U/ml   Basic Metabolic Panel w/ Reflex to MG   Result Value Ref Range    Sodium 137 132 - 146 mmol/L    Potassium reflex Magnesium 4.0 3.5 - 5.0 mmol/L    Chloride 103 98 - 107 mmol/L    CO2 29 22 - 29 mmol/L    Anion Gap 5 (L) 7 - 16 mmol/L    Glucose 105 (H) 74 - 99 mg/dL    BUN 5 (L) 6 - 20 mg/dL    CREATININE 0.9 0.5 - 1.0 mg/dL    GFR Non-African American >60 >=60 mL/min/1.73    GFR African American >60     Calcium 9.0 8.6 - 10.2 mg/dL   CBC auto differential   Result Value Ref Range    WBC 4.9 4.5 - 11.5 E9/L    RBC 3.83 3.50 - 5.50 E12/L    Hemoglobin 9.1 (L) 11.5 - 15.5 g/dL    Hematocrit 31.1 (L) 34.0 - 48.0 %    MCV 81.2 80.0 - 99.9 fL    MCH 23.8 (L) 26.0 - 35.0 pg    MCHC 29.3 (L) 32.0 - 34.5 %    RDW 15.9 (H) 11.5 - 15.0 fL    Platelets 033 333 - 096 E9/L    MPV 9.5 7.0 - 12.0 fL    Neutrophils % 70.6 43.0 - 80.0 %    Immature Granulocytes % 0.2 0.0 - 5.0 %    Lymphocytes % 17.5 (L) 20.0 - 42.0 %    Monocytes % 8.2 2.0 - 12.0 %    Eosinophils % 3.1 0.0 - 6.0 %    Basophils % 0.4 0.0 - 2.0 %    Neutrophils Absolute 3.42 1.80 - 7.30 E9/L    Immature Granulocytes # 0.01 E9/L    Lymphocytes Absolute 0.85 (L) 1.50 - 4.00 E9/L    Monocytes Absolute 0.40 0.10 - 0.95 E9/L    Eosinophils Absolute 0.15 0.05 - 0.50 E9/L    Basophils Absolute 0.02 0.00 - 0.20 E9/L       RADIOLOGY:  US DUP ABD PEL RETRO SCROT COMPLETE   Final Result   1. Cystic/solid lesion in the left adnexa measuring 7.5 cm. Increased in   size from prior exam.  Cannot differentiate the adnexal lesion from the left   ovary. There does appear to be normal color flow and normal arterial/venous   spectral waveforms on the left. 2.  Nonvisualization of the right ovary. No right adnexal mass. 3.  Heterogeneous appearance of the uterus.       RECOMMENDATIONS:   Gyn consultation and pelvic MRI with and without gadolinium to fully   characterize the left ovarian structure. US NON OB TRANSVAGINAL   Final Result   1. Cystic/solid lesion in the left adnexa measuring 7.5 cm. Increased in   size from prior exam.  Cannot differentiate the adnexal lesion from the left   ovary. There does appear to be normal color flow and normal arterial/venous   spectral waveforms on the left. 2.  Nonvisualization of the right ovary. No right adnexal mass. 3.  Heterogeneous appearance of the uterus. RECOMMENDATIONS:   Gyn consultation and pelvic MRI with and without gadolinium to fully   characterize the left ovarian structure. CT ABDOMEN PELVIS W IV CONTRAST Additional Contrast? None   Final Result   No appendicitis identified. Diverticulitis in the descending colon without formation of abscess or   perforation. Interval enlargement left ovarian cyst now measures 6.6 cm (previously 3.5 cm   in June 2021). Further gynecologic evaluation recommended. ------------------------- NURSING NOTES AND VITALS REVIEWED ---------------------------  Date / Time Roomed:  8/6/2021  5:09 PM  ED Bed Assignment:  [id number]    The nursing notes within the ED encounter and vital signs as below have been reviewed.      Patient Vitals for the past 24 hrs:   BP Temp Temp src Pulse Resp SpO2   08/08/21 2100 132/74 99 °F (37.2 °C) Oral 87 16 97 %   08/08/21 0845 (!) 96/57 99.3 °F (37.4 °C) Oral 74 16 97 %       Oxygen Saturation Interpretation: Normal    ------------------------------------------ PROGRESS NOTES ------------------------------------------  Re-evaluation(s):  Time: 1859  Patients symptoms show improvement   Repeat physical examination is significantly improved    Counseling:  I have spoken with the patient and discussed todays results, in addition to providing specific details for the plan of care and counseling regarding the diagnosis and prognosis. Their questions are answered at this time and they are agreeable with the plan of admission.    --------------------------------- ADDITIONAL PROVIDER NOTES ---------------------------------  Consultations:  Time: 2129. Spoke with Dr. Michelle Calderon Discussed case. They will admit the patient. This patient's ED course included: a personal history and physicial examination, re-evaluation prior to disposition and multiple bedside re-evaluations    This patient has remained hemodynamically stable during their ED course. Diagnosis:  1. Abdominal pain  2. Ovarian mass  3. Diverticulitis      Disposition:  Patient's disposition: Admit to med/surg floor  Patient's condition is stable. Katie Argueta DO  Resident  08/07/21 1404      ATTENDING PROVIDER ATTESTATION:     I have personally performed and/or participated in the history, exam, medical decision making, and procedures and agree with all pertinent clinical information unless otherwise noted. I have also reviewed and agree with the past medical, family and social history unless otherwise noted. I have discussed this patient in detail with the resident and provided the instruction and education regarding the evidence-based evaluation and treatment of Abdominal Pain (LLQ radiating to right x 2 hrs, finished atb for diverticulitis w abscess a couple weeks ago, had CT yesterday), Rectal Bleeding (dark stools ), and Emesis (x 2 today )    History: The patient is a 45-year-old female who presents to the emergency department complaining of abdominal pain, dark stools, nausea, and vomiting. My findings: Josefina Llanes is a 46 y.o. female whom is in no distress. Physical exam reveals the patient is alert and oriented x4 and sitting up in bed and resting comfortably and mildly distressed secondary to pain. There is moderate tenderness palpation in the lower abdomen bilateral lower quadrants.   There is no rigidity or rebound tenderness or guarding. My plan: Symptomatic and supportive care. Labs are reassuring and within normal limits. CT scan did show a large cyst versus mass in the ovary along with questionable diverticulitis on CT scan. Cultures were obtained and patient was started on empiric antibiotics. Consulted with hospitalist OB and the patient was admitted for repeat abdominal exams as she continued to complain of pain while in the emergency department after multiple rounds of pain medications.     Electronically signed by Yvette Freeman DO on 8/8/21 at 10:06 PM EDT             Yvette Freeman DO  Resident  08/08/21 [id number]

## 2021-08-07 LAB
ANION GAP SERPL CALCULATED.3IONS-SCNC: 5 MMOL/L (ref 7–16)
BASOPHILS ABSOLUTE: 0.02 E9/L (ref 0–0.2)
BASOPHILS RELATIVE PERCENT: 0.4 % (ref 0–2)
BUN BLDV-MCNC: 7 MG/DL (ref 6–20)
CA 125: 41.4 U/ML (ref 0–35)
CALCIUM SERPL-MCNC: 8.7 MG/DL (ref 8.6–10.2)
CHLORIDE BLD-SCNC: 106 MMOL/L (ref 98–107)
CO2: 28 MMOL/L (ref 22–29)
CREAT SERPL-MCNC: 0.8 MG/DL (ref 0.5–1)
EOSINOPHILS ABSOLUTE: 0.08 E9/L (ref 0.05–0.5)
EOSINOPHILS RELATIVE PERCENT: 1.6 % (ref 0–6)
GFR AFRICAN AMERICAN: >60
GFR NON-AFRICAN AMERICAN: >60 ML/MIN/1.73
GLUCOSE BLD-MCNC: 111 MG/DL (ref 74–99)
HCT VFR BLD CALC: 30.3 % (ref 34–48)
HEMOGLOBIN: 9 G/DL (ref 11.5–15.5)
IMMATURE GRANULOCYTES #: 0.02 E9/L
IMMATURE GRANULOCYTES %: 0.4 % (ref 0–5)
LYMPHOCYTES ABSOLUTE: 1.11 E9/L (ref 1.5–4)
LYMPHOCYTES RELATIVE PERCENT: 22.7 % (ref 20–42)
MCH RBC QN AUTO: 24.1 PG (ref 26–35)
MCHC RBC AUTO-ENTMCNC: 29.7 % (ref 32–34.5)
MCV RBC AUTO: 81 FL (ref 80–99.9)
MONOCYTES ABSOLUTE: 0.47 E9/L (ref 0.1–0.95)
MONOCYTES RELATIVE PERCENT: 9.6 % (ref 2–12)
NEUTROPHILS ABSOLUTE: 3.18 E9/L (ref 1.8–7.3)
NEUTROPHILS RELATIVE PERCENT: 65.3 % (ref 43–80)
PDW BLD-RTO: 15.9 FL (ref 11.5–15)
PLATELET # BLD: 251 E9/L (ref 130–450)
PMV BLD AUTO: 10.2 FL (ref 7–12)
POTASSIUM REFLEX MAGNESIUM: 3.7 MMOL/L (ref 3.5–5)
RBC # BLD: 3.74 E12/L (ref 3.5–5.5)
SODIUM BLD-SCNC: 139 MMOL/L (ref 132–146)
WBC # BLD: 4.9 E9/L (ref 4.5–11.5)

## 2021-08-07 PROCEDURE — 96366 THER/PROPH/DIAG IV INF ADDON: CPT

## 2021-08-07 PROCEDURE — 96375 TX/PRO/DX INJ NEW DRUG ADDON: CPT

## 2021-08-07 PROCEDURE — 2580000003 HC RX 258: Performed by: INTERNAL MEDICINE

## 2021-08-07 PROCEDURE — 36415 COLL VENOUS BLD VENIPUNCTURE: CPT

## 2021-08-07 PROCEDURE — 80048 BASIC METABOLIC PNL TOTAL CA: CPT

## 2021-08-07 PROCEDURE — 86304 IMMUNOASSAY TUMOR CA 125: CPT

## 2021-08-07 PROCEDURE — 6360000002 HC RX W HCPCS: Performed by: INTERNAL MEDICINE

## 2021-08-07 PROCEDURE — 6360000002 HC RX W HCPCS

## 2021-08-07 PROCEDURE — 6370000000 HC RX 637 (ALT 250 FOR IP): Performed by: INTERNAL MEDICINE

## 2021-08-07 PROCEDURE — 96376 TX/PRO/DX INJ SAME DRUG ADON: CPT

## 2021-08-07 PROCEDURE — 99225 PR SBSQ OBSERVATION CARE/DAY 25 MINUTES: CPT | Performed by: INTERNAL MEDICINE

## 2021-08-07 PROCEDURE — 86301 IMMUNOASSAY TUMOR CA 19-9: CPT

## 2021-08-07 PROCEDURE — 85025 COMPLETE CBC W/AUTO DIFF WBC: CPT

## 2021-08-07 PROCEDURE — G0378 HOSPITAL OBSERVATION PER HR: HCPCS

## 2021-08-07 PROCEDURE — 2500000003 HC RX 250 WO HCPCS: Performed by: STUDENT IN AN ORGANIZED HEALTH CARE EDUCATION/TRAINING PROGRAM

## 2021-08-07 PROCEDURE — 6370000000 HC RX 637 (ALT 250 FOR IP)

## 2021-08-07 RX ORDER — SODIUM CHLORIDE 0.9 % (FLUSH) 0.9 %
10 SYRINGE (ML) INJECTION PRN
Status: DISCONTINUED | OUTPATIENT
Start: 2021-08-07 | End: 2021-08-09 | Stop reason: HOSPADM

## 2021-08-07 RX ORDER — CIPROFLOXACIN 2 MG/ML
400 INJECTION, SOLUTION INTRAVENOUS EVERY 12 HOURS
Status: DISCONTINUED | OUTPATIENT
Start: 2021-08-07 | End: 2021-08-09 | Stop reason: HOSPADM

## 2021-08-07 RX ORDER — POLYETHYLENE GLYCOL 3350 17 G/17G
17 POWDER, FOR SOLUTION ORAL DAILY PRN
Status: DISCONTINUED | OUTPATIENT
Start: 2021-08-07 | End: 2021-08-09 | Stop reason: HOSPADM

## 2021-08-07 RX ORDER — ACETAMINOPHEN 325 MG/1
650 TABLET ORAL EVERY 6 HOURS PRN
Status: DISCONTINUED | OUTPATIENT
Start: 2021-08-07 | End: 2021-08-09 | Stop reason: HOSPADM

## 2021-08-07 RX ORDER — ESOMEPRAZOLE MAGNESIUM 20 MG/1
20 FOR SUSPENSION ORAL DAILY
Status: DISCONTINUED | OUTPATIENT
Start: 2021-08-07 | End: 2021-08-07 | Stop reason: CLARIF

## 2021-08-07 RX ORDER — METOPROLOL SUCCINATE 25 MG/1
25 TABLET, EXTENDED RELEASE ORAL NIGHTLY
Status: DISCONTINUED | OUTPATIENT
Start: 2021-08-07 | End: 2021-08-09 | Stop reason: HOSPADM

## 2021-08-07 RX ORDER — SODIUM CHLORIDE 0.9 % (FLUSH) 0.9 %
10 SYRINGE (ML) INJECTION EVERY 12 HOURS SCHEDULED
Status: DISCONTINUED | OUTPATIENT
Start: 2021-08-07 | End: 2021-08-09 | Stop reason: HOSPADM

## 2021-08-07 RX ORDER — MORPHINE SULFATE 2 MG/ML
INJECTION, SOLUTION INTRAMUSCULAR; INTRAVENOUS
Status: COMPLETED
Start: 2021-08-07 | End: 2021-08-07

## 2021-08-07 RX ORDER — SODIUM CHLORIDE 9 MG/ML
25 INJECTION, SOLUTION INTRAVENOUS PRN
Status: DISCONTINUED | OUTPATIENT
Start: 2021-08-07 | End: 2021-08-09 | Stop reason: HOSPADM

## 2021-08-07 RX ORDER — LEVOTHYROXINE SODIUM 0.12 MG/1
125 TABLET ORAL DAILY
Status: DISCONTINUED | OUTPATIENT
Start: 2021-08-07 | End: 2021-08-09 | Stop reason: HOSPADM

## 2021-08-07 RX ORDER — PANTOPRAZOLE SODIUM 40 MG/1
40 TABLET, DELAYED RELEASE ORAL
Status: DISCONTINUED | OUTPATIENT
Start: 2021-08-07 | End: 2021-08-09 | Stop reason: HOSPADM

## 2021-08-07 RX ORDER — ACETAMINOPHEN 650 MG/1
650 SUPPOSITORY RECTAL EVERY 6 HOURS PRN
Status: DISCONTINUED | OUTPATIENT
Start: 2021-08-07 | End: 2021-08-09 | Stop reason: HOSPADM

## 2021-08-07 RX ORDER — MORPHINE SULFATE 2 MG/ML
2 INJECTION, SOLUTION INTRAMUSCULAR; INTRAVENOUS EVERY 4 HOURS PRN
Status: DISCONTINUED | OUTPATIENT
Start: 2021-08-07 | End: 2021-08-09 | Stop reason: HOSPADM

## 2021-08-07 RX ORDER — METOPROLOL SUCCINATE 25 MG/1
TABLET, EXTENDED RELEASE ORAL
Status: COMPLETED
Start: 2021-08-07 | End: 2021-08-07

## 2021-08-07 RX ORDER — SODIUM CHLORIDE, SODIUM LACTATE, POTASSIUM CHLORIDE, CALCIUM CHLORIDE 600; 310; 30; 20 MG/100ML; MG/100ML; MG/100ML; MG/100ML
INJECTION, SOLUTION INTRAVENOUS CONTINUOUS
Status: DISCONTINUED | OUTPATIENT
Start: 2021-08-07 | End: 2021-08-08

## 2021-08-07 RX ADMIN — SODIUM CHLORIDE, POTASSIUM CHLORIDE, SODIUM LACTATE AND CALCIUM CHLORIDE: 600; 310; 30; 20 INJECTION, SOLUTION INTRAVENOUS at 01:37

## 2021-08-07 RX ADMIN — METOPROLOL SUCCINATE 25 MG: 25 TABLET, EXTENDED RELEASE ORAL at 01:37

## 2021-08-07 RX ADMIN — MORPHINE SULFATE 2 MG: 2 INJECTION, SOLUTION INTRAMUSCULAR; INTRAVENOUS at 18:59

## 2021-08-07 RX ADMIN — METOPROLOL SUCCINATE 25 MG: 25 TABLET, FILM COATED, EXTENDED RELEASE ORAL at 01:37

## 2021-08-07 RX ADMIN — METOPROLOL SUCCINATE 25 MG: 25 TABLET, EXTENDED RELEASE ORAL at 22:01

## 2021-08-07 RX ADMIN — MORPHINE SULFATE 2 MG: 2 INJECTION, SOLUTION INTRAMUSCULAR; INTRAVENOUS at 23:05

## 2021-08-07 RX ADMIN — PANTOPRAZOLE SODIUM 40 MG: 40 TABLET, DELAYED RELEASE ORAL at 06:15

## 2021-08-07 RX ADMIN — MORPHINE SULFATE 2 MG: 2 INJECTION, SOLUTION INTRAMUSCULAR; INTRAVENOUS at 01:45

## 2021-08-07 RX ADMIN — SODIUM CHLORIDE, PRESERVATIVE FREE 10 ML: 5 INJECTION INTRAVENOUS at 08:29

## 2021-08-07 RX ADMIN — METRONIDAZOLE 500 MG: 500 INJECTION, SOLUTION INTRAVENOUS at 22:01

## 2021-08-07 RX ADMIN — CIPROFLOXACIN 400 MG: 2 INJECTION, SOLUTION INTRAVENOUS at 15:51

## 2021-08-07 RX ADMIN — MORPHINE SULFATE 2 MG: 2 INJECTION, SOLUTION INTRAMUSCULAR; INTRAVENOUS at 13:13

## 2021-08-07 RX ADMIN — METRONIDAZOLE 500 MG: 500 INJECTION, SOLUTION INTRAVENOUS at 06:16

## 2021-08-07 RX ADMIN — METRONIDAZOLE 500 MG: 500 INJECTION, SOLUTION INTRAVENOUS at 14:42

## 2021-08-07 RX ADMIN — CIPROFLOXACIN 400 MG: 2 INJECTION, SOLUTION INTRAVENOUS at 01:37

## 2021-08-07 RX ADMIN — SODIUM CHLORIDE, PRESERVATIVE FREE 10 ML: 5 INJECTION INTRAVENOUS at 22:01

## 2021-08-07 RX ADMIN — ACETAMINOPHEN 650 MG: 325 TABLET ORAL at 15:39

## 2021-08-07 RX ADMIN — MORPHINE SULFATE 2 MG: 2 INJECTION, SOLUTION INTRAMUSCULAR; INTRAVENOUS at 08:29

## 2021-08-07 RX ADMIN — LEVOTHYROXINE SODIUM 125 MCG: 125 TABLET ORAL at 06:14

## 2021-08-07 ASSESSMENT — PAIN SCALES - GENERAL
PAINLEVEL_OUTOF10: 6
PAINLEVEL_OUTOF10: 0
PAINLEVEL_OUTOF10: 7
PAINLEVEL_OUTOF10: 7
PAINLEVEL_OUTOF10: 5
PAINLEVEL_OUTOF10: 8
PAINLEVEL_OUTOF10: 4
PAINLEVEL_OUTOF10: 8
PAINLEVEL_OUTOF10: 5
PAINLEVEL_OUTOF10: 7
PAINLEVEL_OUTOF10: 5

## 2021-08-07 ASSESSMENT — PAIN DESCRIPTION - PAIN TYPE
TYPE: ACUTE PAIN

## 2021-08-07 ASSESSMENT — PAIN DESCRIPTION - DESCRIPTORS
DESCRIPTORS: SHARP;STABBING
DESCRIPTORS: ACHING;SHARP;STABBING

## 2021-08-07 ASSESSMENT — PAIN DESCRIPTION - LOCATION
LOCATION: ABDOMEN

## 2021-08-07 ASSESSMENT — PAIN DESCRIPTION - PROGRESSION
CLINICAL_PROGRESSION: NOT CHANGED
CLINICAL_PROGRESSION: NOT CHANGED

## 2021-08-07 ASSESSMENT — PAIN DESCRIPTION - ONSET
ONSET: ON-GOING
ONSET: ON-GOING

## 2021-08-07 ASSESSMENT — PAIN DESCRIPTION - ORIENTATION
ORIENTATION: LOWER;LEFT
ORIENTATION: LOWER;LEFT

## 2021-08-07 ASSESSMENT — PAIN DESCRIPTION - FREQUENCY
FREQUENCY: INTERMITTENT
FREQUENCY: INTERMITTENT

## 2021-08-07 ASSESSMENT — PAIN - FUNCTIONAL ASSESSMENT
PAIN_FUNCTIONAL_ASSESSMENT: ACTIVITIES ARE NOT PREVENTED
PAIN_FUNCTIONAL_ASSESSMENT: PREVENTS OR INTERFERES SOME ACTIVE ACTIVITIES AND ADLS
PAIN_FUNCTIONAL_ASSESSMENT: PREVENTS OR INTERFERES SOME ACTIVE ACTIVITIES AND ADLS

## 2021-08-07 ASSESSMENT — PAIN DESCRIPTION - DIRECTION: RADIATING_TOWARDS: RIGHT

## 2021-08-07 NOTE — CONSULTS
Department of Gynecology  Attending Consult Note      Reason for Consult:  Ovarian cyst  Requesting Physician: Dionna Goins    CHIEF COMPLAINT:   Abdominal pain    History obtained from patient    HISTORY OF PRESENT ILLNESS:                   The patient is a 46 y.o. female with significant past medical history of diverticulitis who presents with worsening abdominal pain. Past Medical History:        Diagnosis Date    GERD (gastroesophageal reflux disease)     Hypertension     Thyroid disease      Past Surgical History:        Procedure Laterality Date    BREAST SURGERY       SECTION         Past Gynecological History:        OB History   No obstetric history on file. Medications Prior to Admission:   Medications Prior to Admission: metoprolol succinate (TOPROL XL) 25 MG extended release tablet, Take 25 mg by mouth nightly   esomeprazole Magnesium (NEXIUM) 20 MG PACK, Take 20 mg by mouth daily as needed  levothyroxine (SYNTHROID) 100 MCG tablet, Take 125 mcg by mouth Daily   Cetirizine HCl (ZYRTEC PO), Take 1 tablet by mouth daily as needed. Allergies:  Pcn [penicillins]     Social History:  TOBACCO:   reports that she has never smoked. She has never used smokeless tobacco.  ETOH:   reports current alcohol use. DRUGS:   reports no history of drug use. Family History:   History reviewed. No pertinent family history.     REVIEW OF SYSTEMS:      Constitutional: Negative for  fevers and chills  Eyes:  negative  Ears, nose, mouth, throat, and face:  negative  Respiratory:  negative  Cardiovascular:  negative  Gastrointestinal:  positive for nausea and abdominal pain  Genitourinary:  negative  Integument/breast:  negative  Hematologic/lymphatic:  negative  Allergic/Immunologic:  negative  Endocrine:  negative  Musculoskeletal:  negative  Neurological:  negative  Behavior/Psych:  negative    PHYSICAL EXAM:    Vitals:  /60   Pulse 80   Temp 98 °F (36.7 °C) (Temporal)   Resp 16   Ht 5' 1\" (1.549 m)   Wt 178 lb (80.7 kg)   LMP 02/06/2015   SpO2 99%   BMI 33.63 kg/m²     Abdomen: soft, tender right and left lower quadrant but increased on right. Ext nt    DATA:  Labs:    CBC:   Lab Results   Component Value Date    WBC 4.9 08/07/2021    RBC 3.74 08/07/2021    HGB 9.0 08/07/2021    HCT 30.3 08/07/2021    MCV 81.0 08/07/2021    RDW 15.9 08/07/2021     08/07/2021     CMP:    Lab Results   Component Value Date     08/07/2021    K 3.7 08/07/2021     08/07/2021    CO2 28 08/07/2021    BUN 7 08/07/2021    PROT 7.0 08/06/2021     Radiology Review:    CT abdomen and pelvis from the same day.  Ultrasound pelvis from June 24, 2021       HISTORY:   ORDERING SYSTEM PROVIDED HISTORY: LLQ pain   TECHNOLOGIST PROVIDED HISTORY:       Reason for exam:->LLQ pain   What reading provider will be dictating this exam?->CRC       FINDINGS:       Measurements:       Uterus:  8.0 cm       Endometrial stripe:  Poorly delineated.       Right Ovary:  Not seen       Left Ovary:  Unable to differentiate between the left adnexal lesion and the   left ovary           Ultrasound Findings:       Uterus: Heterogeneous appearance of the uterus.       Endometrial stripe: Endometrial stripe is within normal limits.       Right Ovary: Nonvisualization of the right ovary secondary to anatomic   position and overlying bowel gas.  No right adnexal mass.       Left Ovary:  Left adnexal cystic/solid lesion measuring 7.5 x 7.1 x 5.2 cm. There is normal color flow and arterial/venous spectral waveforms.  The left   ovary margins are not clearly identifiable.  There is normal arterial and   venous doppler flow.       Free Fluid: No evidence of free fluid. EXAMINATION:   CT OF THE ABDOMEN AND PELVIS WITH CONTRAST 8/6/2021 6:36 pm       TECHNIQUE:   CT of the abdomen and pelvis was performed with the administration of   intravenous contrast. Multiplanar reformatted images are provided for review.    Dose modulation, iterative reconstruction, and/or weight based adjustment of   the mA/kV was utilized to reduce the radiation dose to as low as reasonably   achievable.       COMPARISON:   CT abdomen pelvis June 23, 2021       HISTORY:   ORDERING SYSTEM PROVIDED HISTORY: supicion for appy   TECHNOLOGIST PROVIDED HISTORY:   Reason for exam:->supicion for appy   Additional Contrast?->None   Decision Support Exception - unselect if not a suspected or confirmed   emergency medical condition->Emergency Medical Condition (MA)       FINDINGS:   Lung Bases: Bilateral compressive atelectasis.       Liver: Mild interval enlargement in 1.2 cm hypoattenuating lesion in the   caudate lobe, may represent complex cysts.       Bile ducts:  Hyperattenuating material layering within the gallbladder.  No   pericholecystic fat stranding no evidence of intrahepatic or extrahepatic   biliary dilatation.       Pancreas: No pancreatic lesions.  The pancreatic duct is not dilated.       Adrenal glands: Normal in appearance.       Kidneys: No evidence of hydronephrosis.  No abnormal renal lesions. Nonobstructive right midpole calculus measures 3 mm.       Spleen: No enhancing lesions.       Bowel: Small hiatal hernia.  No evidence of bowel obstruction.  There is wall   thickening and mild pericolonic fat stranding in the distal descending colon. No pericolonic abscess.  No free air.       Appendix is normal appearance.       Peritoneum/Retroperitoneum: No abnormal pelvic lymphadenopathy.       Pelvis:   Bladder is incompletely distended.  Interval enlargement of complex   left ovarian cyst now measures approximately 6.6 cm.  Uterus unremarkable.       Bones/Soft tissues: No aggressive osseous lesions.           Impression   No appendicitis identified.       Diverticulitis in the descending colon without formation of abscess or   perforation.       Interval enlargement left ovarian cyst now measures 6.6 cm (previously 3.5 cm   in June 2021).  Shane Butler gynecologic evaluation recommended.             IMPRESSION/RECOMMENDATIONS:      52yo female with enlarging ovarian cyst  Pt has had poor follow up and hasn't been in our office in 6 years  Await tumor markers  Will see how much antibiotics improve her pain.   Will continue to follow

## 2021-08-07 NOTE — H&P
[facility] History and Physical      CHIEF COMPLAINT:  abd pain      History of Present Illness:  46-year-old female with a history of essential hypertension and hypothyroidism. Admitted in  with acute diverticulitis complicated by microperforation and a 2.5 cm abscess. IR unable to drain abscess. He was given IV antibiotics and discharged. Since then she reports continued intermittent abdominal pain and some nausea. However for the past few days her pain recurred, on the left lower quadrant but also on the right. Now the right side is worse than the left. Had some loose stools and vomiting when she eats even with liquids. No fever or chills. Had a CT scan done by general surgery but report is unavailable. Presented to ED for evaluation. Repeat CT showed diverticulitis electrocardiac. Also showed enlarging ovarian cyst, measured 4 cm in  and is now close to 7. Informant(s) for H&P: Patient    REVIEW OF SYSTEMS:  A comprehensive 14 point review of systems was negative except for: what is in the HPI    PMH:  Past Medical History:   Diagnosis Date    GERD (gastroesophageal reflux disease)     Hypertension     Thyroid disease        Surgical History:  Past Surgical History:   Procedure Laterality Date    BREAST SURGERY       SECTION         Medications Prior to Admission:    Prior to Admission medications    Medication Sig Start Date End Date Taking? Authorizing Provider   metoprolol succinate (TOPROL XL) 25 MG extended release tablet Take 25 mg by mouth nightly    Yes Historical Provider, MD   esomeprazole Magnesium (NEXIUM) 20 MG PACK Take 20 mg by mouth daily as needed   Yes Historical Provider, MD   levothyroxine (SYNTHROID) 100 MCG tablet Take 125 mcg by mouth Daily    Yes Historical Provider, MD   Cetirizine HCl (ZYRTEC PO) Take 1 tablet by mouth daily as needed.    Yes Historical Provider, MD       Allergies:    Pcn [penicillins]    Social History: reports that she has never smoked. She has never used smokeless tobacco. She reports current alcohol use. She reports that she does not use drugs. Family History:   family history is not on file. PHYSICAL EXAM:  Vitals:  BP (!) 141/83   Pulse 82   Temp 98.3 °F (36.8 °C) (Temporal)   Resp 16   Ht 5' 1\" (1.549 m)   Wt 178 lb (80.7 kg)   LMP 02/06/2015   SpO2 98%   BMI 33.63 kg/m²   General Appearance: alert and oriented to person, place and time and in no acute distress  Skin: warm and dry, turgor not diminished  Head: normocephalic and atraumatic  Eyes: pupils equal, round, and reactive to light, extraocular eye movements intact, conjunctivae normal  Neck: neck supple and non tender without mass   Pulmonary/Chest: clear to auscultation bilaterally- no wheezes, rales or rhonchi, normal air movement, no respiratory distress  Cardiovascular: normal rate, normal S1 and S2 and no M/R/R  Abdomen: soft, tenderness to palpation across lower quadrant  Extremities: no cyanosis, no clubbing and no edema  Neurologic: no cranial nerve deficit and speech normal        LABS:  Recent Labs     08/06/21  1721      K 3.7      CO2 26   BUN 11   CREATININE 0.7   GLUCOSE 99   CALCIUM 8.9       Recent Labs     08/06/21  1721   WBC 6.6   RBC 4.23   HGB 10.2*   HCT 33.8*   MCV 79.9*   MCH 24.1*   MCHC 30.2*   RDW 15.7*      MPV 9.9       No results for input(s): POCGLU in the last 72 hours. Radiology:   US DUP ABD PEL RETRO SCROT COMPLETE   Final Result   1. Cystic/solid lesion in the left adnexa measuring 7.5 cm. Increased in   size from prior exam.  Cannot differentiate the adnexal lesion from the left   ovary. There does appear to be normal color flow and normal arterial/venous   spectral waveforms on the left. 2.  Nonvisualization of the right ovary. No right adnexal mass. 3.  Heterogeneous appearance of the uterus.       RECOMMENDATIONS:   Gyn consultation and pelvic MRI with and without gadolinium to fully   characterize the left ovarian structure. US NON OB TRANSVAGINAL   Final Result   1. Cystic/solid lesion in the left adnexa measuring 7.5 cm. Increased in   size from prior exam.  Cannot differentiate the adnexal lesion from the left   ovary. There does appear to be normal color flow and normal arterial/venous   spectral waveforms on the left. 2.  Nonvisualization of the right ovary. No right adnexal mass. 3.  Heterogeneous appearance of the uterus. RECOMMENDATIONS:   Gyn consultation and pelvic MRI with and without gadolinium to fully   characterize the left ovarian structure. CT ABDOMEN PELVIS W IV CONTRAST Additional Contrast? None   Final Result   No appendicitis identified. Diverticulitis in the descending colon without formation of abscess or   perforation. Interval enlargement left ovarian cyst now measures 6.6 cm (previously 3.5 cm   in June 2021). Further gynecologic evaluation recommended. ASSESSMENT:    Acute diverticulitis  Ovarian cyst/mass  Essential hypertension  Hypothyroidism  Microcytic anemia  Nonobstructing nephrolithiasis      PLAN:  Acute diverticulitis: Recurrent episode.   -cipro/flagyl  -bowel rest, iv analgesics    Ovarian cyst/mass:  -check tumor markers  -gynecology on board. Hypertension: resume toprol    Hypothyroidism: resume synthroid    Code Status: full  DVT prophylaxis: lovenox      NOTE: This report was transcribed using voice recognition software. Every effort was made to ensure accuracy; however, inadvertent computerized transcription errors may be present.   Electronically signed by Radha Ram MD on 8/6/2021 at 10:52 PM

## 2021-08-07 NOTE — PROGRESS NOTES
[facility]   Progress Note    Admitting Date and Time: 8/6/2021  5:09 PM  Admit Dx: Ovarian cyst [N83.209]    Subjective:    Patient was admitted with Ovarian cyst [N83.209].  in right lower abdomen, left side feeling better. Unsettled but not a bad pain. Wants to eat. ROS: denies fever, chills, cp, sob, n/v, HA unless stated above.      metoprolol succinate  25 mg Oral Nightly    levothyroxine  125 mcg Oral Daily    sodium chloride flush  10 mL Intravenous 2 times per day    enoxaparin  40 mg Subcutaneous Daily    ciprofloxacin  400 mg Intravenous Q12H    pantoprazole  40 mg Oral QAM AC    metroNIDAZOLE  500 mg Intravenous Q8H     sodium chloride flush, 10 mL, PRN  sodium chloride, 25 mL, PRN  polyethylene glycol, 17 g, Daily PRN  acetaminophen, 650 mg, Q6H PRN   Or  acetaminophen, 650 mg, Q6H PRN  morphine, 2 mg, Q4H PRN         Objective:    /79   Pulse 69   Temp 98.2 °F (36.8 °C) (Oral)   Resp 18   Ht 5' 1\" (1.549 m)   Wt 178 lb (80.7 kg)   LMP 02/06/2015   SpO2 97%   BMI 33.63 kg/m²      General Appearance: alert and oriented to person, place and time and in no acute distress  Skin: warm and dry, turgor not diminished  Head: normocephalic and atraumatic  Eyes: pupils equal, round, and reactive to light, extraocular eye movements intact, conjunctivae normal  Neck: neck supple and non tender  Pulmonary/Chest: clear to auscultation bilaterally- no wheezes, rales or rhonchi, normal air movement, no respiratory distress  Cardiovascular: normal rate, normal S1 and S2 and no murmur heard  Abdomen: soft, uncomfortable, but no real point tenderness, no guarding  Extremities: no edema    Recent Labs     08/06/21  1721 08/07/21  0418    139   K 3.7 3.7    106   CO2 26 28   BUN 11 7   CREATININE 0.7 0.8   GLUCOSE 99 111*   CALCIUM 8.9 8.7       Recent Labs     08/06/21  1721 08/07/21  0418   WBC 6.6 4.9   RBC 4.23 3.74   HGB 10.2* 9.0*   HCT ovarian cyst/mass   Grew 3 cm in short time. GYN consult pending. Ordered  and CA 19-9  Per ultrasound read - consider MRI abdomen to further characterize. 3.  HTN - On toprol    4. Hypothyroidism - on synthroid.     Electronically signed by Lashawn Greene MD on 8/7/2021 at 11:49 AM

## 2021-08-08 LAB
ANION GAP SERPL CALCULATED.3IONS-SCNC: 5 MMOL/L (ref 7–16)
BASOPHILS ABSOLUTE: 0.02 E9/L (ref 0–0.2)
BASOPHILS RELATIVE PERCENT: 0.4 % (ref 0–2)
BUN BLDV-MCNC: 5 MG/DL (ref 6–20)
CALCIUM SERPL-MCNC: 9 MG/DL (ref 8.6–10.2)
CHLORIDE BLD-SCNC: 103 MMOL/L (ref 98–107)
CO2: 29 MMOL/L (ref 22–29)
CREAT SERPL-MCNC: 0.9 MG/DL (ref 0.5–1)
EOSINOPHILS ABSOLUTE: 0.15 E9/L (ref 0.05–0.5)
EOSINOPHILS RELATIVE PERCENT: 3.1 % (ref 0–6)
GFR AFRICAN AMERICAN: >60
GFR NON-AFRICAN AMERICAN: >60 ML/MIN/1.73
GLUCOSE BLD-MCNC: 105 MG/DL (ref 74–99)
HCT VFR BLD CALC: 31.1 % (ref 34–48)
HEMOGLOBIN: 9.1 G/DL (ref 11.5–15.5)
IMMATURE GRANULOCYTES #: 0.01 E9/L
IMMATURE GRANULOCYTES %: 0.2 % (ref 0–5)
LYMPHOCYTES ABSOLUTE: 0.85 E9/L (ref 1.5–4)
LYMPHOCYTES RELATIVE PERCENT: 17.5 % (ref 20–42)
MCH RBC QN AUTO: 23.8 PG (ref 26–35)
MCHC RBC AUTO-ENTMCNC: 29.3 % (ref 32–34.5)
MCV RBC AUTO: 81.2 FL (ref 80–99.9)
MONOCYTES ABSOLUTE: 0.4 E9/L (ref 0.1–0.95)
MONOCYTES RELATIVE PERCENT: 8.2 % (ref 2–12)
NEUTROPHILS ABSOLUTE: 3.42 E9/L (ref 1.8–7.3)
NEUTROPHILS RELATIVE PERCENT: 70.6 % (ref 43–80)
PDW BLD-RTO: 15.9 FL (ref 11.5–15)
PLATELET # BLD: 251 E9/L (ref 130–450)
PMV BLD AUTO: 9.5 FL (ref 7–12)
POTASSIUM REFLEX MAGNESIUM: 4 MMOL/L (ref 3.5–5)
RBC # BLD: 3.83 E12/L (ref 3.5–5.5)
SODIUM BLD-SCNC: 137 MMOL/L (ref 132–146)
URINE CULTURE, ROUTINE: NORMAL
WBC # BLD: 4.9 E9/L (ref 4.5–11.5)

## 2021-08-08 PROCEDURE — 96376 TX/PRO/DX INJ SAME DRUG ADON: CPT

## 2021-08-08 PROCEDURE — 36415 COLL VENOUS BLD VENIPUNCTURE: CPT

## 2021-08-08 PROCEDURE — 96366 THER/PROPH/DIAG IV INF ADDON: CPT

## 2021-08-08 PROCEDURE — 6360000002 HC RX W HCPCS: Performed by: INTERNAL MEDICINE

## 2021-08-08 PROCEDURE — 96372 THER/PROPH/DIAG INJ SC/IM: CPT

## 2021-08-08 PROCEDURE — 85025 COMPLETE CBC W/AUTO DIFF WBC: CPT

## 2021-08-08 PROCEDURE — 6370000000 HC RX 637 (ALT 250 FOR IP): Performed by: INTERNAL MEDICINE

## 2021-08-08 PROCEDURE — 2580000003 HC RX 258: Performed by: INTERNAL MEDICINE

## 2021-08-08 PROCEDURE — G0378 HOSPITAL OBSERVATION PER HR: HCPCS

## 2021-08-08 PROCEDURE — 80048 BASIC METABOLIC PNL TOTAL CA: CPT

## 2021-08-08 PROCEDURE — 99225 PR SBSQ OBSERVATION CARE/DAY 25 MINUTES: CPT | Performed by: INTERNAL MEDICINE

## 2021-08-08 PROCEDURE — 2500000003 HC RX 250 WO HCPCS: Performed by: STUDENT IN AN ORGANIZED HEALTH CARE EDUCATION/TRAINING PROGRAM

## 2021-08-08 RX ADMIN — ENOXAPARIN SODIUM 40 MG: 40 INJECTION SUBCUTANEOUS at 08:58

## 2021-08-08 RX ADMIN — CIPROFLOXACIN 400 MG: 2 INJECTION, SOLUTION INTRAVENOUS at 02:32

## 2021-08-08 RX ADMIN — BISACODYL 10 MG: 5 TABLET, COATED ORAL at 11:13

## 2021-08-08 RX ADMIN — PANTOPRAZOLE SODIUM 40 MG: 40 TABLET, DELAYED RELEASE ORAL at 06:05

## 2021-08-08 RX ADMIN — POLYETHYLENE GLYCOL 3350 17 G: 17 POWDER, FOR SOLUTION ORAL at 02:31

## 2021-08-08 RX ADMIN — METRONIDAZOLE 500 MG: 500 INJECTION, SOLUTION INTRAVENOUS at 06:05

## 2021-08-08 RX ADMIN — MORPHINE SULFATE 2 MG: 2 INJECTION, SOLUTION INTRAMUSCULAR; INTRAVENOUS at 11:13

## 2021-08-08 RX ADMIN — MORPHINE SULFATE 2 MG: 2 INJECTION, SOLUTION INTRAMUSCULAR; INTRAVENOUS at 03:00

## 2021-08-08 RX ADMIN — SODIUM CHLORIDE, PRESERVATIVE FREE 10 ML: 5 INJECTION INTRAVENOUS at 21:15

## 2021-08-08 RX ADMIN — METOPROLOL SUCCINATE 25 MG: 25 TABLET, EXTENDED RELEASE ORAL at 21:13

## 2021-08-08 RX ADMIN — MORPHINE SULFATE 2 MG: 2 INJECTION, SOLUTION INTRAMUSCULAR; INTRAVENOUS at 21:13

## 2021-08-08 RX ADMIN — METRONIDAZOLE 500 MG: 500 INJECTION, SOLUTION INTRAVENOUS at 21:47

## 2021-08-08 RX ADMIN — ACETAMINOPHEN 650 MG: 325 TABLET ORAL at 06:07

## 2021-08-08 RX ADMIN — LEVOTHYROXINE SODIUM 125 MCG: 125 TABLET ORAL at 06:05

## 2021-08-08 RX ADMIN — MORPHINE SULFATE 2 MG: 2 INJECTION, SOLUTION INTRAMUSCULAR; INTRAVENOUS at 06:58

## 2021-08-08 RX ADMIN — MORPHINE SULFATE 2 MG: 2 INJECTION, SOLUTION INTRAMUSCULAR; INTRAVENOUS at 15:27

## 2021-08-08 RX ADMIN — CIPROFLOXACIN 400 MG: 2 INJECTION, SOLUTION INTRAVENOUS at 13:42

## 2021-08-08 RX ADMIN — METRONIDAZOLE 500 MG: 500 INJECTION, SOLUTION INTRAVENOUS at 13:42

## 2021-08-08 ASSESSMENT — PAIN DESCRIPTION - LOCATION
LOCATION: RIB CAGE
LOCATION: ABDOMEN;RIB CAGE
LOCATION: RIB CAGE
LOCATION: ABDOMEN;RIB CAGE

## 2021-08-08 ASSESSMENT — ENCOUNTER SYMPTOMS: NAUSEA: 1

## 2021-08-08 ASSESSMENT — PAIN - FUNCTIONAL ASSESSMENT
PAIN_FUNCTIONAL_ASSESSMENT: PREVENTS OR INTERFERES SOME ACTIVE ACTIVITIES AND ADLS
PAIN_FUNCTIONAL_ASSESSMENT: PREVENTS OR INTERFERES SOME ACTIVE ACTIVITIES AND ADLS

## 2021-08-08 ASSESSMENT — PAIN SCALES - GENERAL
PAINLEVEL_OUTOF10: 7
PAINLEVEL_OUTOF10: 5
PAINLEVEL_OUTOF10: 0
PAINLEVEL_OUTOF10: 10
PAINLEVEL_OUTOF10: 9
PAINLEVEL_OUTOF10: 4
PAINLEVEL_OUTOF10: 7
PAINLEVEL_OUTOF10: 8
PAINLEVEL_OUTOF10: 7
PAINLEVEL_OUTOF10: 7

## 2021-08-08 ASSESSMENT — PAIN DESCRIPTION - ONSET
ONSET: ON-GOING
ONSET: ON-GOING

## 2021-08-08 ASSESSMENT — PAIN DESCRIPTION - PROGRESSION
CLINICAL_PROGRESSION: NOT CHANGED
CLINICAL_PROGRESSION: NOT CHANGED

## 2021-08-08 ASSESSMENT — PAIN DESCRIPTION - FREQUENCY
FREQUENCY: INTERMITTENT
FREQUENCY: INTERMITTENT

## 2021-08-08 ASSESSMENT — PAIN DESCRIPTION - ORIENTATION
ORIENTATION: RIGHT

## 2021-08-08 ASSESSMENT — PAIN DESCRIPTION - DESCRIPTORS
DESCRIPTORS: ACHING;DISCOMFORT;SHOOTING;SORE
DESCRIPTORS: ACHING;DISCOMFORT

## 2021-08-08 ASSESSMENT — PAIN DESCRIPTION - PAIN TYPE: TYPE: ACUTE PAIN

## 2021-08-08 NOTE — PROGRESS NOTES
[facility] Hospitalist   Progress Note    Admitting Date and Time: 8/6/2021  5:09 PM  Admit Dx: Ovarian cyst [N83.209]    Subjective:    Patient was admitted with Ovarian cyst [N83.209]. Has new right costal pleuritic type pain much worse with deep breathing. Abdomen better. Feels constipated, no BM for several days. Tolerated clear liquid diet. ROS: denies fever, chills, cp, sob, n/v, HA unless stated above.      metoprolol succinate  25 mg Oral Nightly    levothyroxine  125 mcg Oral Daily    sodium chloride flush  10 mL Intravenous 2 times per day    enoxaparin  40 mg Subcutaneous Daily    ciprofloxacin  400 mg Intravenous Q12H    pantoprazole  40 mg Oral QAM AC    metroNIDAZOLE  500 mg Intravenous Q8H     bisacodyl, 10 mg, Daily PRN  sodium chloride flush, 10 mL, PRN  sodium chloride, 25 mL, PRN  polyethylene glycol, 17 g, Daily PRN  acetaminophen, 650 mg, Q6H PRN   Or  acetaminophen, 650 mg, Q6H PRN  morphine, 2 mg, Q4H PRN         Objective:    BP (!) 96/57   Pulse 74   Temp 99.3 °F (37.4 °C) (Oral)   Resp 16   Ht 5' 1\" (1.549 m)   Wt 188 lb 11.4 oz (85.6 kg)   LMP 02/06/2015   SpO2 97%   BMI 35.66 kg/m²   General Appearance: alert and oriented to person, place and time and in no acute distress  Skin: warm and dry, turgor not diminished  Head: normocephalic and atraumatic  Eyes: pupils equal, round, and reactive to light, extraocular eye movements intact, conjunctivae normal  Neck: neck supple and non tender  Pulmonary/Chest: clear to auscultation bilaterally- no wheezes, rales or rhonchi, normal air movement, no respiratory distress  Cardiovascular: normal rate, normal S1 and S2 and no murmur heard  Abdomen: soft, uncomfortable, but no real point tenderness, no guarding  Extremities: no edema      Recent Labs     08/06/21  1721 08/07/21  0418 08/08/21  0223    139 137   K 3.7 3.7 4.0    106 103   CO2 26 28 29   BUN 11 7 5*   CREATININE 0.7 0.8 0.9   GLUCOSE 99 111* 105*   CALCIUM 8.9 8.7 9.0       Recent Labs     08/06/21  1721 08/07/21  0418 08/08/21  0223   WBC 6.6 4.9 4.9   RBC 4.23 3.74 3.83   HGB 10.2* 9.0* 9.1*   HCT 33.8* 30.3* 31.1*   MCV 79.9* 81.0 81.2   MCH 24.1* 24.1* 23.8*   MCHC 30.2* 29.7* 29.3*   RDW 15.7* 15.9* 15.9*    251 251   MPV 9.9 10.2 9.5       Radiology:   US DUP ABD PEL RETRO SCROT COMPLETE   Final Result   1. Cystic/solid lesion in the left adnexa measuring 7.5 cm. Increased in   size from prior exam.  Cannot differentiate the adnexal lesion from the left   ovary. There does appear to be normal color flow and normal arterial/venous   spectral waveforms on the left. 2.  Nonvisualization of the right ovary. No right adnexal mass. 3.  Heterogeneous appearance of the uterus. RECOMMENDATIONS:   Gyn consultation and pelvic MRI with and without gadolinium to fully   characterize the left ovarian structure. US NON OB TRANSVAGINAL   Final Result   1. Cystic/solid lesion in the left adnexa measuring 7.5 cm. Increased in   size from prior exam.  Cannot differentiate the adnexal lesion from the left   ovary. There does appear to be normal color flow and normal arterial/venous   spectral waveforms on the left. 2.  Nonvisualization of the right ovary. No right adnexal mass. 3.  Heterogeneous appearance of the uterus. RECOMMENDATIONS:   Gyn consultation and pelvic MRI with and without gadolinium to fully   characterize the left ovarian structure. CT ABDOMEN PELVIS W IV CONTRAST Additional Contrast? None   Final Result   No appendicitis identified. Diverticulitis in the descending colon without formation of abscess or   perforation. Interval enlargement left ovarian cyst now measures 6.6 cm (previously 3.5 cm   in June 2021). Further gynecologic evaluation recommended. Assessment:    Active Problems:    Ovarian cyst  Resolved Problems:    * No resolved hospital problems. *      Plan:    1. Acute diverticulitis  On cipro and flagyl. No fever. WBC normal.  Abdominal pain appears to be improving.     Diet to regular, increase activity. Dulcolax for constipation. If patient improves can likely be discharged tomorrow.     2. Right sided ovarian cyst/mass   Grew 3 cm in short time.     GYN consult appreciated. Ordered  and CA 19-9  Per ultrasound read - consider MRI abdomen to further characterize.     3. HTN - On toprol     4. Hypothyroidism - on synthroid.     Electronically signed by Lashawn Greene MD on 8/8/2021 at 12:58 PM

## 2021-08-08 NOTE — PROGRESS NOTES
Patient complaining of increased pain in the RUQ when patient up moving burping and some relief complaining of no bowel movement for 5 days prn miralax given and prn pain medication given labs done and vitals 133/78, 88, 18, 96% on room air 97.8 oral temp ordered heating pad for the back prn per patient requesting and is resting in bed now

## 2021-08-09 VITALS
RESPIRATION RATE: 18 BRPM | BODY MASS INDEX: 35.63 KG/M2 | OXYGEN SATURATION: 98 % | DIASTOLIC BLOOD PRESSURE: 59 MMHG | HEART RATE: 78 BPM | SYSTOLIC BLOOD PRESSURE: 110 MMHG | TEMPERATURE: 98.6 F | HEIGHT: 61 IN | WEIGHT: 188.71 LBS

## 2021-08-09 LAB
ANION GAP SERPL CALCULATED.3IONS-SCNC: 6 MMOL/L (ref 7–16)
BASOPHILS ABSOLUTE: 0.02 E9/L (ref 0–0.2)
BASOPHILS RELATIVE PERCENT: 0.4 % (ref 0–2)
BUN BLDV-MCNC: 5 MG/DL (ref 6–20)
CA 19-9: 72 U/ML (ref 0–37)
CALCIUM SERPL-MCNC: 8.4 MG/DL (ref 8.6–10.2)
CHLORIDE BLD-SCNC: 105 MMOL/L (ref 98–107)
CO2: 28 MMOL/L (ref 22–29)
CREAT SERPL-MCNC: 0.8 MG/DL (ref 0.5–1)
EOSINOPHILS ABSOLUTE: 0.1 E9/L (ref 0.05–0.5)
EOSINOPHILS RELATIVE PERCENT: 1.8 % (ref 0–6)
GFR AFRICAN AMERICAN: >60
GFR NON-AFRICAN AMERICAN: >60 ML/MIN/1.73
GLUCOSE BLD-MCNC: 136 MG/DL (ref 74–99)
HCT VFR BLD CALC: 27.5 % (ref 34–48)
HEMOGLOBIN: 8.2 G/DL (ref 11.5–15.5)
IMMATURE GRANULOCYTES #: 0.01 E9/L
IMMATURE GRANULOCYTES %: 0.2 % (ref 0–5)
LYMPHOCYTES ABSOLUTE: 0.92 E9/L (ref 1.5–4)
LYMPHOCYTES RELATIVE PERCENT: 16.7 % (ref 20–42)
MCH RBC QN AUTO: 24.1 PG (ref 26–35)
MCHC RBC AUTO-ENTMCNC: 29.8 % (ref 32–34.5)
MCV RBC AUTO: 80.9 FL (ref 80–99.9)
MONOCYTES ABSOLUTE: 0.62 E9/L (ref 0.1–0.95)
MONOCYTES RELATIVE PERCENT: 11.2 % (ref 2–12)
NEUTROPHILS ABSOLUTE: 3.85 E9/L (ref 1.8–7.3)
NEUTROPHILS RELATIVE PERCENT: 69.7 % (ref 43–80)
PDW BLD-RTO: 16 FL (ref 11.5–15)
PLATELET # BLD: 216 E9/L (ref 130–450)
PMV BLD AUTO: 10.2 FL (ref 7–12)
POTASSIUM REFLEX MAGNESIUM: 3.6 MMOL/L (ref 3.5–5)
RBC # BLD: 3.4 E12/L (ref 3.5–5.5)
SODIUM BLD-SCNC: 139 MMOL/L (ref 132–146)
WBC # BLD: 5.5 E9/L (ref 4.5–11.5)

## 2021-08-09 PROCEDURE — G0378 HOSPITAL OBSERVATION PER HR: HCPCS

## 2021-08-09 PROCEDURE — 96366 THER/PROPH/DIAG IV INF ADDON: CPT

## 2021-08-09 PROCEDURE — 6360000002 HC RX W HCPCS: Performed by: INTERNAL MEDICINE

## 2021-08-09 PROCEDURE — 80048 BASIC METABOLIC PNL TOTAL CA: CPT

## 2021-08-09 PROCEDURE — 6370000000 HC RX 637 (ALT 250 FOR IP): Performed by: INTERNAL MEDICINE

## 2021-08-09 PROCEDURE — 2580000003 HC RX 258: Performed by: INTERNAL MEDICINE

## 2021-08-09 PROCEDURE — 85025 COMPLETE CBC W/AUTO DIFF WBC: CPT

## 2021-08-09 PROCEDURE — 36415 COLL VENOUS BLD VENIPUNCTURE: CPT

## 2021-08-09 PROCEDURE — 99217 PR OBSERVATION CARE DISCHARGE MANAGEMENT: CPT | Performed by: INTERNAL MEDICINE

## 2021-08-09 PROCEDURE — 2500000003 HC RX 250 WO HCPCS: Performed by: STUDENT IN AN ORGANIZED HEALTH CARE EDUCATION/TRAINING PROGRAM

## 2021-08-09 RX ORDER — CIPROFLOXACIN 500 MG/1
500 TABLET, FILM COATED ORAL 2 TIMES DAILY
Qty: 14 TABLET | Refills: 0 | Status: SHIPPED | OUTPATIENT
Start: 2021-08-09 | End: 2021-08-16

## 2021-08-09 RX ORDER — METRONIDAZOLE 500 MG/1
500 TABLET ORAL 3 TIMES DAILY
Qty: 21 TABLET | Refills: 0 | Status: SHIPPED | OUTPATIENT
Start: 2021-08-09 | End: 2021-08-16

## 2021-08-09 RX ORDER — OXYCODONE HYDROCHLORIDE AND ACETAMINOPHEN 5; 325 MG/1; MG/1
1 TABLET ORAL EVERY 6 HOURS PRN
Qty: 20 TABLET | Refills: 0 | Status: SHIPPED | OUTPATIENT
Start: 2021-08-09 | End: 2021-08-14

## 2021-08-09 RX ADMIN — METRONIDAZOLE 500 MG: 500 INJECTION, SOLUTION INTRAVENOUS at 05:50

## 2021-08-09 RX ADMIN — MORPHINE SULFATE 2 MG: 2 INJECTION, SOLUTION INTRAMUSCULAR; INTRAVENOUS at 05:52

## 2021-08-09 RX ADMIN — LEVOTHYROXINE SODIUM 125 MCG: 125 TABLET ORAL at 05:50

## 2021-08-09 RX ADMIN — SODIUM CHLORIDE, PRESERVATIVE FREE 10 ML: 5 INJECTION INTRAVENOUS at 09:26

## 2021-08-09 RX ADMIN — CIPROFLOXACIN 400 MG: 2 INJECTION, SOLUTION INTRAVENOUS at 13:08

## 2021-08-09 RX ADMIN — PANTOPRAZOLE SODIUM 40 MG: 40 TABLET, DELAYED RELEASE ORAL at 05:50

## 2021-08-09 RX ADMIN — BISACODYL 10 MG: 5 TABLET, COATED ORAL at 05:51

## 2021-08-09 RX ADMIN — CIPROFLOXACIN 400 MG: 2 INJECTION, SOLUTION INTRAVENOUS at 02:29

## 2021-08-09 ASSESSMENT — PAIN SCALES - GENERAL
PAINLEVEL_OUTOF10: 0
PAINLEVEL_OUTOF10: 0
PAINLEVEL_OUTOF10: 8

## 2021-08-09 ASSESSMENT — PAIN DESCRIPTION - ORIENTATION: ORIENTATION: RIGHT

## 2021-08-09 ASSESSMENT — PAIN DESCRIPTION - ONSET: ONSET: ON-GOING

## 2021-08-09 ASSESSMENT — PAIN DESCRIPTION - DESCRIPTORS: DESCRIPTORS: ACHING;DISCOMFORT

## 2021-08-09 ASSESSMENT — PAIN DESCRIPTION - PAIN TYPE: TYPE: ACUTE PAIN

## 2021-08-09 ASSESSMENT — PAIN DESCRIPTION - PROGRESSION: CLINICAL_PROGRESSION: NOT CHANGED

## 2021-08-09 ASSESSMENT — PAIN - FUNCTIONAL ASSESSMENT: PAIN_FUNCTIONAL_ASSESSMENT: ACTIVITIES ARE NOT PREVENTED

## 2021-08-09 ASSESSMENT — PAIN DESCRIPTION - FREQUENCY: FREQUENCY: INTERMITTENT

## 2021-08-09 ASSESSMENT — PAIN DESCRIPTION - LOCATION: LOCATION: ABDOMEN;RIB CAGE

## 2021-08-09 NOTE — PROGRESS NOTES
Patient seen   Chart reviewed   Exam unchanged   incease size of cyst   ca125 slightly elevated   Patient will need to be seen as outpatient and fu in office to plan surgery

## 2021-08-09 NOTE — DISCHARGE SUMMARY
[facility] EMERGENCY SERVICE Physician Discharge Summary       Corie Castillo   [address]  [address]  [phone]    Call  As needed    Clementina Potter MD  [address] [facility]  [phone]    Call  follow up      Activity level: As tolerated    Diet: No diet orders on file    Dispo:Home    Condition at discharge: Stable    Patient ID:  Leatha Palomino  [id number]  89 y.o.  1970    Admit date: 8/6/2021    Discharge date and time:  8/9/2021  4:44 PM    Admission Diagnoses: Active Problems:    Ovarian cyst  Resolved Problems:    * No resolved hospital problems. *      Discharge Diagnoses: Active Problems:    Ovarian cyst  Resolved Problems:    * No resolved hospital problems. *      Consults:  IP CONSULT TO OB GYN    Procedures: None    Hospital Course: Patient was admitted with Ovarian cyst [N83.209]. 51-year-old female with history of hypertension and hypothyroidism and recent acute diverticulitis complicated by microperforation and small abscess. There was an attempt in interventional radiology to drain the abscess but this was unsuccessful. She was given IV antibiotics and discharged home. She has had continued intermittent abdominal pain on and off but pain recurred in the left lower quadrant and then became worse in the right lower quadrant and patient had vomiting and loose stools. Repeat CT shows recurrent diverticulitis but also showed an enlarging ovarian cyst which measured 7 cm as compared to only 4 cm in June. The abscess was no longer present on CT scan. Patient was admitted to hospital and started on Flagyl and Cipro IV. She was afebrile and white blood cell count was normal and her abdominal pain appear to be improving was easily started on clear liquids and then regular diet.  However pain persisted on the right side was felt to be due to the ovarian cyst. GYN consultation was obtained who ordered CA-125 and CA 19-9. Ca125 was mildly elevated at 41.4, but CA 19-9 was still pending at the time of discharge. Gynecology felt the patient was stable for discharge and that she should come back to the office and would likely need surgical resection of the cyst.    Patient was discharged home in good condition with 7 days of Cipro 500 mg twice daily and Flagyl 500 mg 3 times daily. There were no other changes in medications at discharge. Patient should follow-up with her primary care provider within 7 to 10 days.     Discharge Exam:  Vitals:    08/08/21 0845 08/08/21 2100 08/09/21 0721 08/09/21 0915   BP: (!) 96/57 132/74  (!) 110/59   Pulse: 74 87 77 78   Resp: 16 16  18   Temp: 99.3 °F (37.4 °C) 99 °F (37.2 °C)  98.6 °F (37 °C)   TempSrc: Oral Oral  Oral   SpO2: 97% 97%  98%   Weight:       Height:         General Appearance: alert and oriented to person, place and time and in no acute distress  Skin: warm and dry, turgor not diminished  Head: normocephalic and atraumatic  Eyes: pupils equal, round, and reactive to light, extraocular eye movements intact, conjunctivae normal  Neck: neck supple and non tender  Pulmonary/Chest: clear to auscultation bilaterally- no wheezes, rales or rhonchi, normal air movement, no respiratory distress  Cardiovascular: normal rate, normal S1 and S2 and no murmur heard  Abdomen: soft, uncomfortable, but no real point tenderness, no guarding  Extremities: no edema    LABS:  Recent Labs     08/07/21 0418 08/08/21 0223 08/09/21 0312    137 139   K 3.7 4.0 3.6    103 105   CO2 28 29 28   BUN 7 5* 5*   CREATININE 0.8 0.9 0.8   GLUCOSE 111* 105* 136*   CALCIUM 8.7 9.0 8.4*       Recent Labs     08/07/21 0418 08/08/21 0223 08/09/21 0312   WBC 4.9 4.9 5.5   RBC 3.74 3.83 3.40*   HGB 9.0* 9.1* 8.2*   HCT 30.3* 31.1* 27.5*   MCV 81.0 81.2 80.9   MCH 24.1* 23.8* 24.1*   MCHC 29.7* 29.3* 29.8*   RDW 15.9* 15.9* 16.0*    251 216   MPV 10.2 9.5 10.2       No results for input(s): POCGLU in the last 72 hours. Imaging:   US DUP ABD PEL RETRO SCROT COMPLETE   Final Result   1. Cystic/solid lesion in the left adnexa measuring 7.5 cm. Increased in   size from prior exam.  Cannot differentiate the adnexal lesion from the left   ovary. There does appear to be normal color flow and normal arterial/venous   spectral waveforms on the left. 2.  Nonvisualization of the right ovary. No right adnexal mass. 3.  Heterogeneous appearance of the uterus. RECOMMENDATIONS:   Gyn consultation and pelvic MRI with and without gadolinium to fully   characterize the left ovarian structure. US NON OB TRANSVAGINAL   Final Result   1. Cystic/solid lesion in the left adnexa measuring 7.5 cm. Increased in   size from prior exam.  Cannot differentiate the adnexal lesion from the left   ovary. There does appear to be normal color flow and normal arterial/venous   spectral waveforms on the left. 2.  Nonvisualization of the right ovary. No right adnexal mass. 3.  Heterogeneous appearance of the uterus. RECOMMENDATIONS:   Gyn consultation and pelvic MRI with and without gadolinium to fully   characterize the left ovarian structure. CT ABDOMEN PELVIS W IV CONTRAST Additional Contrast? None   Final Result   No appendicitis identified. Diverticulitis in the descending colon without formation of abscess or   perforation. Interval enlargement left ovarian cyst now measures 6.6 cm (previously 3.5 cm   in June 2021). Further gynecologic evaluation recommended.              Patient Instructions:      Medication List      START taking these medications    ciprofloxacin 500 MG tablet  Commonly known as: CIPRO  Take 1 tablet by mouth 2 times daily for 7 days     metroNIDAZOLE 500 MG tablet  Commonly known as: FLAGYL  Take 1 tablet by mouth 3 times daily for 7 days     oxyCODONE-acetaminophen 5-325 MG per tablet  Commonly known as: Percocet  Take 1 tablet by mouth every 6 hours as needed for Pain for up to 5 days. Intended supply: 5 days. Take lowest dose possible to manage pain        CONTINUE taking these medications    esomeprazole Magnesium 20 MG Pack  Commonly known as: NEXIUM     metoprolol succinate 25 MG extended release tablet  Commonly known as: TOPROL XL     Synthroid 100 MCG tablet  Generic drug: levothyroxine     ZYRTEC PO           Where to Get Your Medications      You can get these medications from any pharmacy    Bring a paper prescription for each of these medications  · ciprofloxacin 500 MG tablet  · metroNIDAZOLE 500 MG tablet  · oxyCODONE-acetaminophen 5-325 MG per tablet           Note that More than 30 minutes was spent in preparing discharge papers, discussing discharge with patient, medication review, etc.    Signed:  Electronically signed by Bina Leon MD on 8/9/2021 at 4:44 PM    NOTE: This report was transcribed using voice recognition software. Every effort was made to ensure accuracy; however, inadvertent computerized transcription errors may be present.

## 2021-08-09 NOTE — PLAN OF CARE
Problem: Pain:  Goal: Pain level will decrease  Description: Pain level will decrease  5/1/3108 [id number] by Richi Lepe RN  Outcome: Ongoing  8/9/2021 0640 by Arpit Tariq RN  Outcome: Met This Shift  Goal: Control of acute pain  Description: Control of acute pain  4/3/5374 9037 by Richi Lepe RN  Outcome: Ongoing  8/9/2021 0640 by Arpit Tariq RN  Outcome: Met This Shift  Goal: Control of chronic pain  Description: Control of chronic pain  4/8/7308 9039 by Richi Lepe RN  Outcome: Ongoing  8/9/2021 0640 by Arpit Tariq RN  Outcome: Met This Shift     Problem: Falls - Risk of:  Goal: Will remain free from falls  Description: Will remain free from falls  0/1/2029 1816 by Richi Lepe RN  Outcome: Ongoing  8/9/2021 0640 by Arpit Tariq RN  Outcome: Met This Shift  Goal: Absence of physical injury  Description: Absence of physical injury  2/7/6478 5405 by Richi Lepe RN  Outcome: Ongoing  8/9/2021 0640 by Arpit Tariq RN  Outcome: Met This Shift

## 2021-08-09 NOTE — CARE COORDINATION
Social WOrk / Discharge Planning : SW met with patient and explained role as discharge planner/ transition of care. Patient admitted with Ovarian Cyst.. Patient verified plan at discharge is HOME . Patient is totally independent and denies hx of HHC/SNF. Patient PCP is DR Tess Zuniga and she uses [facility] in [employer]. Patient denies and HOME needs . AWait plan. Electronically signed by JAIR Hinkle on 8/9/21 at 9:43 AM EDT

## 2021-08-09 NOTE — PROGRESS NOTES
Call placed to Dr Mayen to check for discharge. Per office staff, she is unavailable until after noon due to her surgery schedule and there is no way to reach her at this time. Will attempt to recall her later this afternoon.

## 2021-08-11 LAB
BLOOD CULTURE, ROUTINE: NORMAL
CULTURE, BLOOD 2: NORMAL

## 2021-11-22 ENCOUNTER — HOSPITAL ENCOUNTER (OUTPATIENT)
Age: 51
Discharge: HOME | End: 2021-11-22
Payer: COMMERCIAL

## 2021-11-22 LAB
ALBUMIN SERPL-MCNC: 4.1 G/DL (ref 3.5–5.2)
ALP BLD-CCNC: 290 U/L (ref 35–104)
ALT SERPL-CCNC: 61 U/L (ref 0–32)
ANION GAP SERPL CALCULATED.3IONS-SCNC: 10 MMOL/L (ref 7–16)
AST SERPL-CCNC: 41 U/L (ref 0–31)
BASOPHILS ABSOLUTE: 0.03 E9/L (ref 0–0.2)
BASOPHILS RELATIVE PERCENT: 0.6 % (ref 0–2)
BILIRUB SERPL-MCNC: 0.5 MG/DL (ref 0–1.2)
BUN BLDV-MCNC: 10 MG/DL (ref 6–20)
CALCIUM SERPL-MCNC: 9.5 MG/DL (ref 8.6–10.2)
CHLORIDE BLD-SCNC: 103 MMOL/L (ref 98–107)
CO2: 25 MMOL/L (ref 22–29)
CREAT SERPL-MCNC: 0.7 MG/DL (ref 0.5–1)
EOSINOPHILS ABSOLUTE: 0.18 E9/L (ref 0.05–0.5)
EOSINOPHILS RELATIVE PERCENT: 3.6 % (ref 0–6)
GFR AFRICAN AMERICAN: >60
GFR NON-AFRICAN AMERICAN: >60 ML/MIN/1.73
GLUCOSE BLD-MCNC: 101 MG/DL (ref 74–99)
HCT VFR BLD CALC: 38.6 % (ref 34–48)
HEMOGLOBIN: 11.9 G/DL (ref 11.5–15.5)
IMMATURE GRANULOCYTES #: 0.01 E9/L
IMMATURE GRANULOCYTES %: 0.2 % (ref 0–5)
LYMPHOCYTES ABSOLUTE: 1.32 E9/L (ref 1.5–4)
LYMPHOCYTES RELATIVE PERCENT: 26.5 % (ref 20–42)
MAGNESIUM: 2 MG/DL (ref 1.6–2.6)
MCH RBC QN AUTO: 25.4 PG (ref 26–35)
MCHC RBC AUTO-ENTMCNC: 30.8 % (ref 32–34.5)
MCV RBC AUTO: 82.5 FL (ref 80–99.9)
MONOCYTES ABSOLUTE: 0.42 E9/L (ref 0.1–0.95)
MONOCYTES RELATIVE PERCENT: 8.4 % (ref 2–12)
NEUTROPHILS ABSOLUTE: 3.03 E9/L (ref 1.8–7.3)
NEUTROPHILS RELATIVE PERCENT: 60.7 % (ref 43–80)
PDW BLD-RTO: 18.6 FL (ref 11.5–15)
PLATELET # BLD: 297 E9/L (ref 130–450)
PMV BLD AUTO: 8.9 FL (ref 7–12)
POTASSIUM SERPL-SCNC: 3.9 MMOL/L (ref 3.5–5)
RBC # BLD: 4.68 E12/L (ref 3.5–5.5)
SODIUM BLD-SCNC: 138 MMOL/L (ref 132–146)
TOTAL PROTEIN: 7.3 G/DL (ref 6.4–8.3)
WBC # BLD: 5 E9/L (ref 4.5–11.5)

## 2021-11-22 PROCEDURE — 83735 ASSAY OF MAGNESIUM: CPT

## 2021-11-22 PROCEDURE — 82728 ASSAY OF FERRITIN: CPT

## 2021-11-22 PROCEDURE — 85025 COMPLETE CBC W/AUTO DIFF WBC: CPT

## 2021-11-22 PROCEDURE — 84443 ASSAY THYROID STIM HORMONE: CPT

## 2021-11-22 PROCEDURE — 80053 COMPREHEN METABOLIC PANEL: CPT

## 2021-11-22 PROCEDURE — 83540 ASSAY OF IRON: CPT

## 2021-11-22 PROCEDURE — 83550 IRON BINDING TEST: CPT

## 2021-11-22 PROCEDURE — 36415 COLL VENOUS BLD VENIPUNCTURE: CPT

## 2021-11-23 LAB
FERRITIN: 39 NG/ML
IRON SATURATION: 10 % (ref 15–50)
IRON: 40 MCG/DL (ref 37–145)
TOTAL IRON BINDING CAPACITY: 391 MCG/DL (ref 250–450)
TSH SERPL DL<=0.05 MIU/L-ACNC: 10.35 UIU/ML (ref 0.27–4.2)

## 2022-01-20 ENCOUNTER — HOSPITAL ENCOUNTER (EMERGENCY)
Age: 52
Discharge: HOME | End: 2022-01-20
Payer: COMMERCIAL

## 2022-01-20 ENCOUNTER — APPOINTMENT (OUTPATIENT)
Dept: CT IMAGING | Age: 52
End: 2022-01-20
Payer: COMMERCIAL

## 2022-01-20 VITALS
DIASTOLIC BLOOD PRESSURE: 70 MMHG | HEART RATE: 80 BPM | WEIGHT: 150 LBS | OXYGEN SATURATION: 97 % | SYSTOLIC BLOOD PRESSURE: 140 MMHG | BODY MASS INDEX: 28.34 KG/M2 | RESPIRATION RATE: 18 BRPM | TEMPERATURE: 97.3 F

## 2022-01-20 DIAGNOSIS — R11.2 NAUSEA VOMITING AND DIARRHEA: Primary | ICD-10-CM

## 2022-01-20 DIAGNOSIS — R19.7 NAUSEA VOMITING AND DIARRHEA: Primary | ICD-10-CM

## 2022-01-20 DIAGNOSIS — R10.84 GENERALIZED ABDOMINAL PAIN: ICD-10-CM

## 2022-01-20 LAB
ALBUMIN SERPL-MCNC: 4.6 G/DL (ref 3.5–5.2)
ALP BLD-CCNC: 217 U/L (ref 35–104)
ALT SERPL-CCNC: 69 U/L (ref 0–32)
ANION GAP SERPL CALCULATED.3IONS-SCNC: 14 MMOL/L (ref 7–16)
AST SERPL-CCNC: 46 U/L (ref 0–31)
BASOPHILS ABSOLUTE: 0.02 E9/L (ref 0–0.2)
BASOPHILS RELATIVE PERCENT: 0.5 % (ref 0–2)
BILIRUB SERPL-MCNC: 0.7 MG/DL (ref 0–1.2)
BUN BLDV-MCNC: 14 MG/DL (ref 6–20)
CALCIUM SERPL-MCNC: 9.8 MG/DL (ref 8.6–10.2)
CHLORIDE BLD-SCNC: 102 MMOL/L (ref 98–107)
CO2: 20 MMOL/L (ref 22–29)
CREAT SERPL-MCNC: 0.9 MG/DL (ref 0.5–1)
EOSINOPHILS ABSOLUTE: 0.09 E9/L (ref 0.05–0.5)
EOSINOPHILS RELATIVE PERCENT: 2.3 % (ref 0–6)
GFR AFRICAN AMERICAN: >60
GFR NON-AFRICAN AMERICAN: >60 ML/MIN/1.73
GLUCOSE BLD-MCNC: 119 MG/DL (ref 74–99)
HCT VFR BLD CALC: 41 % (ref 34–48)
HEMOGLOBIN: 13.4 G/DL (ref 11.5–15.5)
IMMATURE GRANULOCYTES #: 0.01 E9/L
IMMATURE GRANULOCYTES %: 0.3 % (ref 0–5)
LACTIC ACID: 1.4 MMOL/L (ref 0.5–2.2)
LIPASE: 43 U/L (ref 13–60)
LYMPHOCYTES ABSOLUTE: 0.68 E9/L (ref 1.5–4)
LYMPHOCYTES RELATIVE PERCENT: 17.6 % (ref 20–42)
MCH RBC QN AUTO: 26.9 PG (ref 26–35)
MCHC RBC AUTO-ENTMCNC: 32.7 % (ref 32–34.5)
MCV RBC AUTO: 82.2 FL (ref 80–99.9)
MONOCYTES ABSOLUTE: 0.4 E9/L (ref 0.1–0.95)
MONOCYTES RELATIVE PERCENT: 10.3 % (ref 2–12)
NEUTROPHILS ABSOLUTE: 2.67 E9/L (ref 1.8–7.3)
NEUTROPHILS RELATIVE PERCENT: 69 % (ref 43–80)
PDW BLD-RTO: 17.9 FL (ref 11.5–15)
PLATELET # BLD: 239 E9/L (ref 130–450)
PMV BLD AUTO: 10 FL (ref 7–12)
POTASSIUM REFLEX MAGNESIUM: 4.3 MMOL/L (ref 3.5–5)
RBC # BLD: 4.99 E12/L (ref 3.5–5.5)
SARS-COV-2, NAAT: NOT DETECTED
SODIUM BLD-SCNC: 136 MMOL/L (ref 132–146)
TOTAL PROTEIN: 7.9 G/DL (ref 6.4–8.3)
WBC # BLD: 3.9 E9/L (ref 4.5–11.5)

## 2022-01-20 PROCEDURE — 36415 COLL VENOUS BLD VENIPUNCTURE: CPT

## 2022-01-20 PROCEDURE — 83690 ASSAY OF LIPASE: CPT

## 2022-01-20 PROCEDURE — 96374 THER/PROPH/DIAG INJ IV PUSH: CPT

## 2022-01-20 PROCEDURE — 6360000004 HC RX CONTRAST MEDICATION: Performed by: RADIOLOGY

## 2022-01-20 PROCEDURE — 83605 ASSAY OF LACTIC ACID: CPT

## 2022-01-20 PROCEDURE — 6360000002 HC RX W HCPCS: Performed by: PHYSICIAN ASSISTANT

## 2022-01-20 PROCEDURE — 85025 COMPLETE CBC W/AUTO DIFF WBC: CPT

## 2022-01-20 PROCEDURE — 99283 EMERGENCY DEPT VISIT LOW MDM: CPT

## 2022-01-20 PROCEDURE — 2580000003 HC RX 258: Performed by: PHYSICIAN ASSISTANT

## 2022-01-20 PROCEDURE — 74177 CT ABD & PELVIS W/CONTRAST: CPT

## 2022-01-20 PROCEDURE — 87635 SARS-COV-2 COVID-19 AMP PRB: CPT

## 2022-01-20 PROCEDURE — 2580000003 HC RX 258: Performed by: RADIOLOGY

## 2022-01-20 PROCEDURE — 80053 COMPREHEN METABOLIC PANEL: CPT

## 2022-01-20 PROCEDURE — 96375 TX/PRO/DX INJ NEW DRUG ADDON: CPT

## 2022-01-20 RX ORDER — 0.9 % SODIUM CHLORIDE 0.9 %
1000 INTRAVENOUS SOLUTION INTRAVENOUS ONCE
Status: COMPLETED | OUTPATIENT
Start: 2022-01-20 | End: 2022-01-20

## 2022-01-20 RX ORDER — POTASSIUM CHLORIDE 1.5 G/1.77G
20 POWDER, FOR SOLUTION ORAL 2 TIMES DAILY
COMMUNITY
End: 2022-08-06

## 2022-01-20 RX ORDER — MORPHINE SULFATE 10 MG/ML
5 INJECTION, SOLUTION INTRAMUSCULAR; INTRAVENOUS ONCE
Status: COMPLETED | OUTPATIENT
Start: 2022-01-20 | End: 2022-01-20

## 2022-01-20 RX ORDER — ONDANSETRON 4 MG/1
4 TABLET, FILM COATED ORAL EVERY 8 HOURS PRN
Qty: 12 TABLET | Refills: 0 | Status: SHIPPED | OUTPATIENT
Start: 2022-01-20 | End: 2022-01-25

## 2022-01-20 RX ORDER — ONDANSETRON 2 MG/ML
4 INJECTION INTRAMUSCULAR; INTRAVENOUS ONCE
Status: COMPLETED | OUTPATIENT
Start: 2022-01-20 | End: 2022-01-20

## 2022-01-20 RX ORDER — SODIUM CHLORIDE 0.9 % (FLUSH) 0.9 %
10 SYRINGE (ML) INJECTION PRN
Status: COMPLETED | OUTPATIENT
Start: 2022-01-20 | End: 2022-01-20

## 2022-01-20 RX ADMIN — SODIUM CHLORIDE, PRESERVATIVE FREE 10 ML: 5 INJECTION INTRAVENOUS at 22:41

## 2022-01-20 RX ADMIN — IOPAMIDOL 75 ML: 755 INJECTION, SOLUTION INTRAVENOUS at 22:41

## 2022-01-20 RX ADMIN — MORPHINE SULFATE 5 MG: 10 INJECTION INTRAVENOUS at 22:54

## 2022-01-20 RX ADMIN — ONDANSETRON 4 MG: 2 INJECTION INTRAMUSCULAR; INTRAVENOUS at 21:47

## 2022-01-20 RX ADMIN — SODIUM CHLORIDE 1000 ML: 9 INJECTION, SOLUTION INTRAVENOUS at 21:46

## 2022-01-20 ASSESSMENT — PAIN SCALES - GENERAL
PAINLEVEL_OUTOF10: 5
PAINLEVEL_OUTOF10: 8

## 2022-01-20 ASSESSMENT — PAIN DESCRIPTION - DESCRIPTORS: DESCRIPTORS: CRAMPING

## 2022-01-20 ASSESSMENT — PAIN DESCRIPTION - LOCATION: LOCATION: ABDOMEN

## 2022-01-20 ASSESSMENT — PAIN DESCRIPTION - PAIN TYPE: TYPE: ACUTE PAIN

## 2022-01-21 NOTE — ED PROVIDER NOTES
[facility]                                                                                                                                      Department of Emergency Medicine   ED  Provider Note  Admit Date/RoomTime: 1/20/2022  8:52 PM  ED Room: 15/15        HPI:  1/20/22,   Time: 9:12 PM JOSUE Bustillo is a 46 y.o. female presenting to the ED for abdominal pain, N/V/D, beginning this AM.  The complaint has been persistent, moderate in severity, and worsened by nothing. The patient has history of metastatic sigmoid colon CA. She has ileostomy and is still undergoing active chemotherapy. Comes in today with N/V/D. Onset this AM.   States her oncologist called Zofran and another medication to pharmacy but neither have been helping. Pain pain under umbilicus as well. Cramping. Does not radiate. Aggravated with vomiting and diarrhea. No black or bloody stools reported. Ileostomy seems to be functioning well. Denies fever or chills. States she couldn't keep anything down today. Worried about getting dehydrated. ROS:     Constitutional: Negative for fever and chills  HENT: Negative for ear pain, sore throat and sinus pressure  Eyes: Negative for pain, discharge and redness  Respiratory:  Negative for shortness of breath, cough and wheezing  Cardiovascular: Negative for CP, edema or palpitations  Gastrointestinal: See HPI  Genitourinary: Negative for dysuria and frequency  Musculoskeletal: Negative for back pain and arthralgia  Skin: Negative for rash and wound  Neurological: Negative for weakness and headaches  Hematological: Negative for adenopathy    All other systems reviewed and are negative      -------------------------------- PAST HISTORY ----------------------------------  Past Medical History:  has a past medical history of GERD (gastroesophageal reflux disease), Hypertension, and Thyroid disease.     Past Surgical History:  has a past surgical history that includes Breast surgery and  section. Social History:  reports that she has never smoked. She has never used smokeless tobacco. She reports current alcohol use. She reports that she does not use drugs. Family History: family history is not on file. The patients home medications have been reviewed.     Allergies: Pcn [penicillins]    --------------------------------- RESULTS ------------------------------------------  All laboratory and radiology results have been personally reviewed by myself   LABS:  Results for orders placed or performed during the hospital encounter of 22   CBC Auto Differential   Result Value Ref Range    WBC 3.9 (L) 4.5 - 11.5 E9/L    RBC 4.99 3.50 - 5.50 E12/L    Hemoglobin 13.4 11.5 - 15.5 g/dL    Hematocrit 41.0 34.0 - 48.0 %    MCV 82.2 80.0 - 99.9 fL    MCH 26.9 26.0 - 35.0 pg    MCHC 32.7 32.0 - 34.5 %    RDW 17.9 (H) 11.5 - 15.0 fL    Platelets 515 405 - 981 E9/L    MPV 10.0 7.0 - 12.0 fL    Neutrophils % 69.0 43.0 - 80.0 %    Immature Granulocytes % 0.3 0.0 - 5.0 %    Lymphocytes % 17.6 (L) 20.0 - 42.0 %    Monocytes % 10.3 2.0 - 12.0 %    Eosinophils % 2.3 0.0 - 6.0 %    Basophils % 0.5 0.0 - 2.0 %    Neutrophils Absolute 2.67 1.80 - 7.30 E9/L    Immature Granulocytes # 0.01 E9/L    Lymphocytes Absolute 0.68 (L) 1.50 - 4.00 E9/L    Monocytes Absolute 0.40 0.10 - 0.95 E9/L    Eosinophils Absolute 0.09 0.05 - 0.50 E9/L    Basophils Absolute 0.02 0.00 - 0.20 E9/L   Comprehensive Metabolic Panel w/ Reflex to MG   Result Value Ref Range    Sodium 136 132 - 146 mmol/L    Potassium reflex Magnesium 4.3 3.5 - 5.0 mmol/L    Chloride 102 98 - 107 mmol/L    CO2 20 (L) 22 - 29 mmol/L    Anion Gap 14 7 - 16 mmol/L    Glucose 119 (H) 74 - 99 mg/dL    BUN 14 6 - 20 mg/dL    CREATININE 0.9 0.5 - 1.0 mg/dL    GFR Non-African American >60 >=60 mL/min/1.73    GFR African American >60     Calcium 9.8 8.6 - 10.2 mg/dL    Total Protein 7.9 6.4 - 8.3 g/dL    Albumin 4.6 3.5 - 5.2 g/dL Total Bilirubin 0.7 0.0 - 1.2 mg/dL    Alkaline Phosphatase 217 (H) 35 - 104 U/L    ALT 69 (H) 0 - 32 U/L    AST 46 (H) 0 - 31 U/L   Lactic Acid, Plasma   Result Value Ref Range    Lactic Acid 1.4 0.5 - 2.2 mmol/L   Lipase   Result Value Ref Range    Lipase 43 13 - 60 U/L       RADIOLOGY:  Interpreted by Radiologist.  CT ABDOMEN PELVIS W IV CONTRAST Additional Contrast? None   Final Result   1. Interim enlargement of a 2 mm right lower lobe pulmonary nodule to 6 mm,   concerning for METASTATIC DISEASE. 2. New 7 mm hypodense lesion in the right lobe of the liver concerning for   metastatic disease (series 2, image 31). 3. Surgical clips along the caudate lobe in the left lobe of the liver. Please correlate with surgical history. 4. Bilateral nephrolithiasis. No hydronephrosis. RECOMMENDATIONS:   Unavailable             ----------------- NURSING NOTES AND VITALS REVIEWED ---------------   The nursing notes within the ED encounter and vital signs as below have been reviewed. BP (!) 140/73   Pulse 84   Temp 97.7 °F (36.5 °C) (Oral)   Resp 18   Wt 150 lb (68 kg)   LMP 02/06/2015   SpO2 97%   BMI 28.34 kg/m²   Oxygen Saturation Interpretation: Normal      --------------------------------PHYSICAL EXAM------------------------------------      Constitutional/General: Alert and oriented x3, well appearing, non toxic in NAD  Head: NC/AT  Eyes: PERRL, EOMI  Mouth:  Dry oral mucosa  Neck: Supple, full ROM, no meningeal signs  Pulmonary: Lungs clear to auscultation bilaterally, no wheezes, rales, or rhonchi. Not in respiratory distress  Cardiovascular:  Regular rate and rhythm, no murmurs, gallops, or rubs. 2+ distal pulses  Abdomen: Soft, + BS. No distension. Long vertical scar from epigastrium to pubis symphysis. Intact/well healed. Ileostomy noted RLQ. Pt is tender just under the umbilicus. No palpable rigidity, rebound or guarding  Extremities: Moves all extremities x 4.  Warm and well perfused  Skin: warm and dry without rash  Neurologic: GCS 15,  Intact. No focal deficits  Psych: Normal Affect      ------------------------ ED COURSE/MEDICAL DECISION MAKING----------------------  Medications   0.9 % sodium chloride bolus (0 mLs IntraVENous Stopped 1/20/22 2304)   ondansetron (ZOFRAN) injection 4 mg (4 mg IntraVENous Given 1/20/22 2147)   iopamidol (ISOVUE-370) 76 % injection 75 mL (75 mLs IntraVENous Given 1/20/22 2241)   sodium chloride flush 0.9 % injection 10 mL (10 mLs IntraVENous Given 1/20/22 2241)   morphine (PF) injection 5 mg (5 mg IntraVENous Given 1/20/22 2254)         Medical Decision Making:    Pt is feeling much better after fluids, antiemetics and pain meds. CT scan abd/pelvis demonstrates no evidence of obstruction. Metastatic disease still seen lung and liver. This is not new. Plan discharge home with Zofran ODT. Sent script directly to pharmacy. Counseling: The emergency provider has spoken with the patient and discussed todays results, in addition to providing specific details for the plan of care and counseling regarding the diagnosis and prognosis. Questions are answered at this time and they are agreeable with the plan.      ------------------------ IMPRESSION AND DISPOSITION -------------------------------    IMPRESSION  1. Nausea vomiting and diarrhea    2.  Generalized abdominal pain        DISPOSITION  Disposition: Discharge to home  Patient condition is stable                    Denisse Trivedi PA-C  01/20/22 2801

## 2022-01-21 NOTE — ED NOTES
Pt has Hx. Of colon cancer, has ileostomy. Last chemo 1 week ago.      Miriam Simons RN  01/20/22 2100

## 2022-01-21 NOTE — ED NOTES
5473  Using sterile technique, right subclavian port accessed with 20g power point needle, flushes without difficulty, blood sample obtained     Kenton Back RN  01/20/22 3832

## 2022-01-23 ENCOUNTER — HOSPITAL ENCOUNTER (EMERGENCY)
Age: 52
Discharge: HOME | End: 2022-01-24
Attending: EMERGENCY MEDICINE
Payer: COMMERCIAL

## 2022-01-23 VITALS
DIASTOLIC BLOOD PRESSURE: 76 MMHG | TEMPERATURE: 97.6 F | OXYGEN SATURATION: 100 % | RESPIRATION RATE: 14 BRPM | HEART RATE: 64 BPM | SYSTOLIC BLOOD PRESSURE: 128 MMHG | BODY MASS INDEX: 28.72 KG/M2 | WEIGHT: 152 LBS

## 2022-01-23 DIAGNOSIS — E86.0 DEHYDRATION: Primary | ICD-10-CM

## 2022-01-23 DIAGNOSIS — R19.8 INCREASED ILEOSTOMY OUTPUT (HCC): ICD-10-CM

## 2022-01-23 DIAGNOSIS — Z93.2 INCREASED ILEOSTOMY OUTPUT (HCC): ICD-10-CM

## 2022-01-23 DIAGNOSIS — E87.20 METABOLIC ACIDOSIS: ICD-10-CM

## 2022-01-23 DIAGNOSIS — N17.9 AKI (ACUTE KIDNEY INJURY) (HCC): ICD-10-CM

## 2022-01-23 LAB
ALBUMIN SERPL-MCNC: 4.9 G/DL (ref 3.5–5.2)
ALP BLD-CCNC: 227 U/L (ref 35–104)
ALT SERPL-CCNC: 67 U/L (ref 0–32)
ANION GAP SERPL CALCULATED.3IONS-SCNC: 14 MMOL/L (ref 7–16)
ANION GAP SERPL CALCULATED.3IONS-SCNC: 18 MMOL/L (ref 7–16)
AST SERPL-CCNC: 50 U/L (ref 0–31)
BASOPHILS ABSOLUTE: 0.04 E9/L (ref 0–0.2)
BASOPHILS RELATIVE PERCENT: 0.7 % (ref 0–2)
BILIRUB SERPL-MCNC: 0.4 MG/DL (ref 0–1.2)
BUN BLDV-MCNC: 22 MG/DL (ref 6–20)
BUN BLDV-MCNC: 27 MG/DL (ref 6–20)
CALCIUM SERPL-MCNC: 8 MG/DL (ref 8.6–10.2)
CALCIUM SERPL-MCNC: 9.4 MG/DL (ref 8.6–10.2)
CHLORIDE BLD-SCNC: 103 MMOL/L (ref 98–107)
CHLORIDE BLD-SCNC: 94 MMOL/L (ref 98–107)
CO2: 19 MMOL/L (ref 22–29)
CO2: 21 MMOL/L (ref 22–29)
CREAT SERPL-MCNC: 1.5 MG/DL (ref 0.5–1)
CREAT SERPL-MCNC: 1.9 MG/DL (ref 0.5–1)
EOSINOPHILS ABSOLUTE: 0.12 E9/L (ref 0.05–0.5)
EOSINOPHILS RELATIVE PERCENT: 2 % (ref 0–6)
GFR AFRICAN AMERICAN: 34
GFR AFRICAN AMERICAN: 44
GFR NON-AFRICAN AMERICAN: 28 ML/MIN/1.73
GFR NON-AFRICAN AMERICAN: 37 ML/MIN/1.73
GLUCOSE BLD-MCNC: 107 MG/DL (ref 74–99)
GLUCOSE BLD-MCNC: 108 MG/DL (ref 74–99)
HCT VFR BLD CALC: 43.4 % (ref 34–48)
HEMOGLOBIN: 14.2 G/DL (ref 11.5–15.5)
IMMATURE GRANULOCYTES #: 0.01 E9/L
IMMATURE GRANULOCYTES %: 0.2 % (ref 0–5)
LACTIC ACID: 2.1 MMOL/L (ref 0.5–2.2)
LACTIC ACID: 3.1 MMOL/L (ref 0.5–2.2)
LYMPHOCYTES ABSOLUTE: 2.47 E9/L (ref 1.5–4)
LYMPHOCYTES RELATIVE PERCENT: 41.1 % (ref 20–42)
MAGNESIUM: 2.2 MG/DL (ref 1.6–2.6)
MCH RBC QN AUTO: 26.8 PG (ref 26–35)
MCHC RBC AUTO-ENTMCNC: 32.7 % (ref 32–34.5)
MCV RBC AUTO: 81.9 FL (ref 80–99.9)
MONOCYTES ABSOLUTE: 0.82 E9/L (ref 0.1–0.95)
MONOCYTES RELATIVE PERCENT: 13.6 % (ref 2–12)
NEUTROPHILS ABSOLUTE: 2.55 E9/L (ref 1.8–7.3)
NEUTROPHILS RELATIVE PERCENT: 42.4 % (ref 43–80)
PDW BLD-RTO: 18.2 FL (ref 11.5–15)
PLATELET # BLD: 221 E9/L (ref 130–450)
PMV BLD AUTO: 10.1 FL (ref 7–12)
POTASSIUM SERPL-SCNC: 3.2 MMOL/L (ref 3.5–5)
POTASSIUM SERPL-SCNC: 3.2 MMOL/L (ref 3.5–5)
RBC # BLD: 5.3 E12/L (ref 3.5–5.5)
SODIUM BLD-SCNC: 133 MMOL/L (ref 132–146)
SODIUM BLD-SCNC: 136 MMOL/L (ref 132–146)
TOTAL PROTEIN: 8.1 G/DL (ref 6.4–8.3)
WBC # BLD: 6 E9/L (ref 4.5–11.5)

## 2022-01-23 PROCEDURE — 83735 ASSAY OF MAGNESIUM: CPT

## 2022-01-23 PROCEDURE — 99283 EMERGENCY DEPT VISIT LOW MDM: CPT

## 2022-01-23 PROCEDURE — 2580000003 HC RX 258: Performed by: EMERGENCY MEDICINE

## 2022-01-23 PROCEDURE — 36415 COLL VENOUS BLD VENIPUNCTURE: CPT

## 2022-01-23 PROCEDURE — 96374 THER/PROPH/DIAG INJ IV PUSH: CPT

## 2022-01-23 PROCEDURE — 85025 COMPLETE CBC W/AUTO DIFF WBC: CPT

## 2022-01-23 PROCEDURE — 83605 ASSAY OF LACTIC ACID: CPT

## 2022-01-23 PROCEDURE — 80048 BASIC METABOLIC PNL TOTAL CA: CPT

## 2022-01-23 PROCEDURE — 80053 COMPREHEN METABOLIC PANEL: CPT

## 2022-01-23 PROCEDURE — 6370000000 HC RX 637 (ALT 250 FOR IP): Performed by: EMERGENCY MEDICINE

## 2022-01-23 PROCEDURE — 6360000002 HC RX W HCPCS: Performed by: EMERGENCY MEDICINE

## 2022-01-23 PROCEDURE — 96361 HYDRATE IV INFUSION ADD-ON: CPT

## 2022-01-23 RX ORDER — 0.9 % SODIUM CHLORIDE 0.9 %
1000 INTRAVENOUS SOLUTION INTRAVENOUS ONCE
Status: COMPLETED | OUTPATIENT
Start: 2022-01-23 | End: 2022-01-24

## 2022-01-23 RX ORDER — ONDANSETRON 2 MG/ML
8 INJECTION INTRAMUSCULAR; INTRAVENOUS ONCE
Status: COMPLETED | OUTPATIENT
Start: 2022-01-23 | End: 2022-01-23

## 2022-01-23 RX ORDER — POTASSIUM CHLORIDE 20 MEQ/1
40 TABLET, EXTENDED RELEASE ORAL ONCE
Status: COMPLETED | OUTPATIENT
Start: 2022-01-23 | End: 2022-01-23

## 2022-01-23 RX ORDER — 0.9 % SODIUM CHLORIDE 0.9 %
2000 INTRAVENOUS SOLUTION INTRAVENOUS ONCE
Status: COMPLETED | OUTPATIENT
Start: 2022-01-23 | End: 2022-01-23

## 2022-01-23 RX ADMIN — ONDANSETRON 8 MG: 2 INJECTION INTRAMUSCULAR; INTRAVENOUS at 20:56

## 2022-01-23 RX ADMIN — SODIUM CHLORIDE 1000 ML: 9 INJECTION, SOLUTION INTRAVENOUS at 23:07

## 2022-01-23 RX ADMIN — SODIUM CHLORIDE 2000 ML: 9 INJECTION, SOLUTION INTRAVENOUS at 20:53

## 2022-01-23 RX ADMIN — POTASSIUM CHLORIDE 40 MEQ: 1500 TABLET, EXTENDED RELEASE ORAL at 23:04

## 2022-01-23 ASSESSMENT — PAIN DESCRIPTION - LOCATION: LOCATION: HEAD

## 2022-01-23 ASSESSMENT — PAIN SCALES - GENERAL: PAINLEVEL_OUTOF10: 6

## 2022-01-24 PROCEDURE — 6360000002 HC RX W HCPCS: Performed by: EMERGENCY MEDICINE

## 2022-01-24 RX ORDER — HEPARIN SODIUM (PORCINE) LOCK FLUSH IV SOLN 100 UNIT/ML 100 UNIT/ML
100 SOLUTION INTRAVENOUS ONCE
Status: COMPLETED | OUTPATIENT
Start: 2022-01-24 | End: 2022-01-24

## 2022-01-24 RX ADMIN — HEPARIN 100 UNITS: 100 SYRINGE at 00:58

## 2022-01-24 NOTE — ED NOTES
Pt resting quietly with  at bedside. No complaints at this time. Denies nausea. Resp easy and nonlabored on room air. Call light in reach.       Mohit Ferguson RN  01/23/22 1994

## 2022-01-24 NOTE — ED NOTES
Pt resting quietly in bed talking to  at bedside. Ivf infusing per orders. Pt has no complaints at this time. Resp easy and nonlabored on room air. Call light in reach.        Nathan Campuzano RN  01/23/22 4292

## 2022-01-24 NOTE — ED NOTES
Physician aware of abnormal lab results, no new orders at this time.       Raymon Batista, PETERSON  01/23/22 8182

## 2022-01-24 NOTE — ED NOTES
Pt ambulated to restroom with assistance of spouse. Has c/o nausea and dizziness. Per Dr Mireya Osborne, may access pts [facility] for meds and labs.       Latha Houston RN  01/23/22 2026

## 2022-01-24 NOTE — ED PROVIDER NOTES
HPI:  22,   Time: 8:12 PM JOSUE Corea is a 46 y.o. female presenting to the ED for dizziness, increased output in her ileostomy also complains of dizziness fatigue muscle cramping. Beginning several days ago. The complaint has been persistent, moderate in severity, and worsened by standing. Patient has a history of: See with PA with mets to the liver. She was diagnosed several months ago and had a partial colectomy and ileostomy. She reports increased output in her ileostomy. She was recently admitted for dehydration and electrolyte imbalance. She does see Dr. Dawson Holley at [facility] and surgeon up at [facility]. She reports no fevers or chills. She has no nausea. She denies any gabriella blood from her ileostomy. She was recently seen in the emergency department several days ago for the same complaints. She is given IV fluids had a CT scan of the belly which was unremarkable for obstruction. She does report increased urinary output from oral intake. ROS:   Pertinent positives and negatives are stated within HPI, all other systems reviewed and are negative.  --------------------------------------------- PAST HISTORY ---------------------------------------------  Past Medical History:  has a past medical history of GERD (gastroesophageal reflux disease), Hypertension, and Thyroid disease. Past Surgical History:  has a past surgical history that includes Breast surgery;  section; and Port Surgery. Social History:  reports that she has never smoked. She has never used smokeless tobacco. She reports current alcohol use. She reports that she does not use drugs. Family History: family history is not on file. The patients home medications have been reviewed.     Allergies: Pcn [penicillins] and Other    -------------------------------------------------- RESULTS -------------------------------------------------  All laboratory and radiology results have been personally reviewed by myself   LABS:  Results for orders placed or performed during the hospital encounter of 01/23/22   CBC Auto Differential   Result Value Ref Range    WBC 6.0 4.5 - 11.5 E9/L    RBC 5.30 3.50 - 5.50 E12/L    Hemoglobin 14.2 11.5 - 15.5 g/dL    Hematocrit 43.4 34.0 - 48.0 %    MCV 81.9 80.0 - 99.9 fL    MCH 26.8 26.0 - 35.0 pg    MCHC 32.7 32.0 - 34.5 %    RDW 18.2 (H) 11.5 - 15.0 fL    Platelets 140 243 - 124 E9/L    MPV 10.1 7.0 - 12.0 fL    Neutrophils % 42.4 (L) 43.0 - 80.0 %    Immature Granulocytes % 0.2 0.0 - 5.0 %    Lymphocytes % 41.1 20.0 - 42.0 %    Monocytes % 13.6 (H) 2.0 - 12.0 %    Eosinophils % 2.0 0.0 - 6.0 %    Basophils % 0.7 0.0 - 2.0 %    Neutrophils Absolute 2.55 1.80 - 7.30 E9/L    Immature Granulocytes # 0.01 E9/L    Lymphocytes Absolute 2.47 1.50 - 4.00 E9/L    Monocytes Absolute 0.82 0.10 - 0.95 E9/L    Eosinophils Absolute 0.12 0.05 - 0.50 E9/L    Basophils Absolute 0.04 0.00 - 0.20 E9/L   Comprehensive Metabolic Panel   Result Value Ref Range    Sodium 133 132 - 146 mmol/L    Potassium 3.2 (L) 3.5 - 5.0 mmol/L    Chloride 94 (L) 98 - 107 mmol/L    CO2 21 (L) 22 - 29 mmol/L    Anion Gap 18 (H) 7 - 16 mmol/L    Glucose 107 (H) 74 - 99 mg/dL    BUN 27 (H) 6 - 20 mg/dL    CREATININE 1.9 (H) 0.5 - 1.0 mg/dL    GFR Non-African American 28 >=60 mL/min/1.73    GFR African American 34     Calcium 9.4 8.6 - 10.2 mg/dL    Total Protein 8.1 6.4 - 8.3 g/dL    Albumin 4.9 3.5 - 5.2 g/dL    Total Bilirubin 0.4 0.0 - 1.2 mg/dL    Alkaline Phosphatase 227 (H) 35 - 104 U/L    ALT 67 (H) 0 - 32 U/L    AST 50 (H) 0 - 31 U/L   Lactic Acid, Plasma   Result Value Ref Range    Lactic Acid 3.1 (H) 0.5 - 2.2 mmol/L   Magnesium   Result Value Ref Range    Magnesium 2.2 1.6 - 2.6 mg/dL       RADIOLOGY:  Interpreted by Radiologist.  No orders to display       ------------------------- NURSING NOTES AND VITALS REVIEWED ---------------------------   The nursing notes within the ED encounter and vital signs as below have been reviewed. /80   Pulse 68   Temp 97.7 °F (36.5 °C)   Resp 18   LMP 02/06/2015   SpO2 98%   Oxygen Saturation Interpretation: Normal      ---------------------------------------------------PHYSICAL EXAM--------------------------------------      Constitutional/General: Alert and oriented x3, well appearing, non toxic in NAD  Head: NC/AT  Eyes: PERRL, EOMI  Mouth: Oropharynx clear, handling secretions, no trismus, mucous membranes dry  Neck: Supple, full ROM, no meningeal signs  Pulmonary: Lungs clear to auscultation bilaterally, no wheezes, rales, or rhonchi. Not in respiratory distress  Cardiovascular:  Regular rate and rhythm, no murmurs, gallops, or rubs. 2+ distal pulses  Abdomen: Soft, non tender, non distended, no rebound tenderness or guarding. Patient's laparotomy scars are healed clean and dry. Extremities: Moves all extremities x 4. Warm and well perfused  Skin: warm and dry without rash  Neurologic: GCS 15, no focal deficits. Psych: Normal Affect      ------------------------------ ED COURSE/MEDICAL DECISION MAKING----------------------  Medications   potassium chloride (KLOR-CON M) extended release tablet 40 mEq (has no administration in time range)   0.9 % sodium chloride bolus (has no administration in time range)   0.9 % sodium chloride bolus (2,000 mLs IntraVENous New Bag 1/23/22 2053)   ondansetron (ZOFRAN) injection 8 mg (8 mg IntraVENous Given 1/23/22 2056)         Medical Decision Making:    Patient is given IV fluids electrolytes were reviewed. Is found to be acute kidney injury metabolic acidosis potassium was low 3.2 mEq/L. She was given oral Klor-Con 40 M EQ's to liters of normal saline. She is given Zofran 8 mg for nausea. Patient's lactic acid was 3.1. Repeat lactate and BMP is pending. Patient stated she does feel a little bit better after initial fluids.   I informed the patient that she may need to be admitted to the hospital if her

## 2022-08-06 ENCOUNTER — HOSPITAL ENCOUNTER (EMERGENCY)
Age: 52
Discharge: HOME | End: 2022-08-06
Payer: COMMERCIAL

## 2022-08-06 VITALS
BODY MASS INDEX: 28.72 KG/M2 | HEART RATE: 68 BPM | WEIGHT: 152 LBS | DIASTOLIC BLOOD PRESSURE: 86 MMHG | SYSTOLIC BLOOD PRESSURE: 146 MMHG | OXYGEN SATURATION: 99 % | TEMPERATURE: 97.4 F | RESPIRATION RATE: 16 BRPM

## 2022-08-06 DIAGNOSIS — H66.93 BILATERAL OTITIS MEDIA, UNSPECIFIED OTITIS MEDIA TYPE: Primary | ICD-10-CM

## 2022-08-06 PROCEDURE — 6370000000 HC RX 637 (ALT 250 FOR IP): Performed by: PHYSICIAN ASSISTANT

## 2022-08-06 PROCEDURE — 99283 EMERGENCY DEPT VISIT LOW MDM: CPT

## 2022-08-06 PROCEDURE — 6370000000 HC RX 637 (ALT 250 FOR IP)

## 2022-08-06 RX ORDER — CEFDINIR 300 MG/1
300 CAPSULE ORAL 2 TIMES DAILY
Qty: 14 CAPSULE | Refills: 0 | Status: SHIPPED | OUTPATIENT
Start: 2022-08-06 | End: 2022-08-13

## 2022-08-06 RX ORDER — CEFDINIR 300 MG/1
CAPSULE ORAL
Status: COMPLETED
Start: 2022-08-06 | End: 2022-08-06

## 2022-08-06 RX ORDER — METHYLPREDNISOLONE 4 MG/1
TABLET ORAL
Qty: 1 KIT | Refills: 0 | Status: SHIPPED | OUTPATIENT
Start: 2022-08-06 | End: 2022-08-12

## 2022-08-06 RX ORDER — PREDNISONE 20 MG/1
60 TABLET ORAL ONCE
Status: COMPLETED | OUTPATIENT
Start: 2022-08-06 | End: 2022-08-06

## 2022-08-06 RX ORDER — FLUOROURACIL 50 MG/ML
INJECTION, SOLUTION INTRAVENOUS ONCE
COMMUNITY

## 2022-08-06 RX ORDER — CEFDINIR 300 MG/1
300 CAPSULE ORAL EVERY 12 HOURS SCHEDULED
Status: DISCONTINUED | OUTPATIENT
Start: 2022-08-06 | End: 2022-08-06 | Stop reason: HOSPADM

## 2022-08-06 RX ADMIN — PREDNISONE 60 MG: 20 TABLET ORAL at 18:49

## 2022-08-06 RX ADMIN — CEFDINIR 300 MG: 300 CAPSULE ORAL at 18:49

## 2022-08-06 ASSESSMENT — PAIN - FUNCTIONAL ASSESSMENT: PAIN_FUNCTIONAL_ASSESSMENT: 0-10

## 2022-08-06 ASSESSMENT — PAIN SCALES - GENERAL: PAINLEVEL_OUTOF10: 10

## 2022-08-06 ASSESSMENT — PAIN DESCRIPTION - LOCATION: LOCATION: EAR

## 2022-08-06 ASSESSMENT — PAIN DESCRIPTION - ORIENTATION: ORIENTATION: RIGHT;LEFT

## 2022-08-06 ASSESSMENT — PAIN DESCRIPTION - FREQUENCY: FREQUENCY: CONTINUOUS

## 2022-08-06 ASSESSMENT — PAIN DESCRIPTION - PAIN TYPE: TYPE: ACUTE PAIN

## 2022-08-06 NOTE — ED PROVIDER NOTES
[facility]    HPI:  22, Time: 6:41 PM EDT         Karan Gunter is a 46 y.o. female presenting to the ED for bilateral otalgia, beginning 3 weeks ago. The complaint has been persistent, moderate in severity, and worsened by nothing. Patient reports that she was evaluated at urgent care 2 weeks ago. She was prescribed 5 days of Levaquin for bilateral ear infections. Reports that she is finished this without much relief of her symptoms. Reports that her symptoms are still present therefore prompting ED evaluation today. Denies any fever, nausea, vomiting, abdominal pain, back pain chest pain or shortness of breath. Afebrile without recent travel or sick contacts. Patient denies all other symptoms at this time. Review of Systems:   A complete review of systems was performed and pertinent positives and negatives are stated within HPI, all other systems reviewed and are negative.          --------------------------------------------- PAST HISTORY ---------------------------------------------  Past Medical History:  has a past medical history of GERD (gastroesophageal reflux disease), Hypertension, and Thyroid disease. Past Surgical History:  has a past surgical history that includes Breast surgery;  section; and Port Surgery. Social History:  reports that she has never smoked. She has never used smokeless tobacco. She reports current alcohol use. She reports that she does not use drugs. Family History: family history is not on file. The patients home medications have been reviewed. Allergies: Pcn [penicillins] and Other    -------------------------------------------------- RESULTS -------------------------------------------------  All laboratory and radiology results have been personally reviewed by myself   LABS:  No results found for this visit on 22.     RADIOLOGY:  Interpreted by Radiologist.  No orders to display       ------------------------- NURSING NOTES AND VITALS REVIEWED ---------------------------   The nursing notes within the ED encounter and vital signs as below have been reviewed. BP (!) 146/86   Pulse 68   Temp 97.4 °F (36.3 °C)   Resp 16   Wt 152 lb (68.9 kg)   LMP 02/06/2015   SpO2 99%   BMI 28.72 kg/m²   Oxygen Saturation Interpretation: Normal      ---------------------------------------------------PHYSICAL EXAM--------------------------------------      Constitutional/General: Alert and oriented x3, well appearing, non toxic in NAD  Head: Normocephalic and atraumatic. Bilateral tympanic membranes slightly erythematous with air-fluid levels. Small amount of purulence noted behind the TMs bilaterally. External auditory canals pink and dry bilaterally. Eyes: PERRL, EOMI  Mouth: Oropharynx clear, handling secretions, no trismus  Neck: Supple, full ROM,   Extremities: Moves all extremities x 4. Warm and well perfused  Skin: warm and dry without rash  Neurologic: GCS 15,  Psych: Normal Affect      ------------------------------ ED COURSE/MEDICAL DECISION MAKING----------------------  Medications   cefdinir (OMNICEF) capsule 300 mg (has no administration in time range)   predniSONE (DELTASONE) tablet 60 mg (60 mg Oral Given 8/6/22 1849)   cefdinir (OMNICEF) 300 MG capsule (300 mg  Given 8/6/22 1849)         ED COURSE:       Medical Decision Making:    Patient is a 59-year-old female presenting to the emergency department with bilateral otalgia x3 weeks. Did finish a course of Levaquin for 5 days without symptom improvement. Physical exam findings are consistent with bilateral otitis media still. Otherwise she is nontoxic-appearing, afebrile, no acute distress. We will plan on outpatient management with antibiotics and steroids. Advised to follow-up very closely with PCP for recheck and return to the emergency department with any new or worsening symptoms.   Patient and her  voiced understanding and are agreeable to the above treatment plan.    Counseling: The emergency provider has spoken with the patient and spouse/SO and discussed todays results, in addition to providing specific details for the plan of care and counseling regarding the diagnosis and prognosis. Questions are answered at this time and they are agreeable with the plan.      --------------------------------- IMPRESSION AND DISPOSITION ---------------------------------    IMPRESSION  1. Bilateral otitis media, unspecified otitis media type        DISPOSITION  Disposition: Discharge to home  Patient condition is stable      NOTE: This report was transcribed using voice recognition software.  Every effort was made to ensure accuracy; however, inadvertent computerized transcription errors may be present        Katheryn Magana  08/06/22 4676

## 2024-02-19 NOTE — DISCHARGE SUMMARY
OB/Gyn was consulted for 4.7 cm L ovarian lesion of mixed echogenicity; they recommend outpatient follow up in this regard. Discharge Exam  /74   Pulse 68   Temp 98.5 °F (36.9 °C) (Oral)   Resp 16   Ht 5' 1\" (1.549 m)   Wt 178 lb 14.4 oz (81.1 kg)   LMP 02/06/2015   SpO2 94%   BMI 33.80 kg/m²   General Appearance: alert and oriented to person, place and time and in no acute distress  Skin: warm and dry  Head: normocephalic and atraumatic  Eyes: pupils equal, round, and reactive to light, extraocular eye movements intact, conjunctivae normal  Neck: neck supple and non tender without mass   Pulmonary/Chest: clear to auscultation bilaterally- no wheezes, rales or rhonchi, normal air movement, no respiratory distress  Cardiovascular: normal rate, normal S1 and S2 and no carotid bruits  Abdomen: soft, non-tender, non-distended, normal bowel sounds, no masses or organomegaly  Extremities: no cyanosis, no clubbing and no edema  Neurologic: no cranial nerve deficit and speech normal    I/O last 3 completed shifts: In: 110 [I.V.:10; IV Piggyback:100]  Out: -   No intake/output data recorded. Labs  Recent Labs     06/23/21  0945 06/24/21  0648 06/25/21  0440    140 138   K 3.7 4.2 3.9    106 103   CO2 27 27 28   BUN 10 13 13   CREATININE 0.9 0.8 0.8   GLUCOSE 90 98 102*   CALCIUM 9.3 8.8 8.9   WBC 5.0 6.2 6.3   RBC 4.24 4.01 4.16   HGB 10.5* 9.6* 10.1*   HCT 35.0 32.8* 34.0   MCV 82.5 81.8 81.7   MCH 24.8* 23.9* 24.3*   MCHC 30.0* 29.3* 29.7*   RDW 16.6* 16.5* 16.4*    243 243   MPV 9.8 9.9 9.8       Imaging  US PELVIS COMPLETE    Result Date: 6/24/2021  1. Incompletely characterized left ovarian lesion which measures 4.7 cm. This is mixed echogenicity on this exam. 2.  Normal appearance of the uterus, endometrium, and right ovary. No right adnexal mass. Normal right ovarian vascularity.  RECOMMENDATIONS: Recommend pelvic MRI with and without gadolinium for complete characterization of the left ovarian lesion. CT ABDOMEN PELVIS W IV CONTRAST Additional Contrast? None    Result Date: 6/23/2021  1. Grossly stable appearance of the acute diverticulitis in the distal descending colon, with evidence of microperforation. Edema and phlegmon is seen at the site of the inflammation. No loculated fluid collection to indicate abscess. 2. 3.2 cm complex appearing left ovarian cyst.  Consider sonographic evaluation, especially if the patient is postmenopausal. 3. Bilateral nephrolithiasis. No hydronephrosis. 4. Hypodensities in the liver. In the absence of known malignancy or other risk factors, a benign etiology is favored (cyst or hemangioma). If clinical concern warrants, follow-up with pre and post-contrast enhanced MRI of the abdomen may be obtained. CT ABDOMEN PELVIS W IV CONTRAST Additional Contrast? None    Result Date: 6/19/2021  1. Diverticulitis involving left colon with adjacent loculated fluid and gas collection suggesting contained perforation or diverticular abscess. 2.  No free air or free fluid. 3.  Cystic lesion associated with the left ovary measures 2.8 x 3.2 cm. Ultrasound follow-up recommended for further evaluation. 4.  Multiple nonobstructing bilateral renal calculi. 5.  Hypoattenuating lesions associated with the liver likely represent cysts. Ultrasound follow-up could be helpful for further evaluation.  RECOMMENDATIONS: Managing Incidental Adnexal Cystic Mass by CT or MR Benign cyst: Premenopausal (< or equal to 50 years if LMP unknown) < or equal to 5 cm: no follow up >5 cm: US in 6-12 weeks > or equal to 10 cm: US promptly Early postmenopausal (0-5 years after LMP) < or equal to 3 cm: no follow up 3-5 cm: US in 6-12 weeks >5 cm: US promptly Late postmenopausal < or equal to 3 cm: no follow up >3 cm: US promptly Probably benign cyst : {benign appearing except demonstrates one or more of the following - (a) angulated margin, (b) not round/oval shape, (c) not well imaged due to artifact or technical parameters (ex. noncontrast CT) Premenopausal (< than or equal to 50 years if LMP unknown) < or equal to 3 cm: no follow up 3-5 cm: US in 6-12 weeks >5 cm: US Early postmenopausal < or equal to 3 cm: no follow up >3 cm: US promptly Late postmenopausal < or equal to 1 cm: no follow up >1 cm: US Reference: Joe Nazario et al. Managing Incidental Findings on Abdominal CT: White Paper of the ACR Incidental Findings Committee. J Am Parish Radiol 2010;7:754-773     Patient Instructions     Medication List      START taking these medications    cefdinir 300 MG capsule  Commonly known as: OMNICEF  Take 1 capsule by mouth 2 times daily for 10 days     metroNIDAZOLE 500 MG tablet  Commonly known as: FLAGYL  Take 1 tablet by mouth 3 times daily for 10 days     oxyCODONE 5 MG immediate release tablet  Commonly known as: ROXICODONE  Take 1 tablet by mouth every 6 hours as needed for Pain for up to 3 days.         CONTINUE taking these medications    esomeprazole Magnesium 20 MG Pack  Commonly known as: NEXIUM     metoprolol succinate 25 MG extended release tablet  Commonly known as: TOPROL XL     Synthroid 100 MCG tablet  Generic drug: levothyroxine     ZYRTEC PO           Where to Get Your Medications      These medications were sent to [address] [place] [address]  [address] [address]    Phone: [phone]   · cefdinir 300 MG capsule  · metroNIDAZOLE 500 MG tablet     You can get these medications from any pharmacy    Bring a paper prescription for each of these medications  · oxyCODONE 5 MG immediate release tablet       Note that more than 30 minutes was spent in preparing discharge papers, discussing discharge with patient, medication review, etc.    Electronically signed by Colonel Fer DO on 6/25/2021 at 12:19 PM stated

## 2024-10-07 NOTE — PROGRESS NOTES
[facility]  GI Medical Oncology Clinic  Consult Outpatient Visit    Patient Name: Yaima Tolbert  MRN: [id number]  Date of Service: 10/8/24  PCP: No primary care provider on file.  Primary Oncologist: Dr. Ian Hope, [facility]    Diagnosis: metastatic colon cancer ([id number] - Stage IV (4/20 LN+))  NGS:   Caris: APC, CDK8, KRAS (G13C), TP53, ISAURA, TMB 4 mut/Mb  DPYD - Normal Metabolizer  UGT1A1 - Intermediate Metabolizer *1/*80+*28   CEA: 109.5 (10/1/24), 14 (6/4/24)      Reason for Consult: second opinion, clinical trial evaluation  Consult requested by: Dr. Hope     HPI:    Ms. Yaima Tolbert is 53 yo F with history of GERD, HTN, hypothyroidism, and metastatic colon cancer. She is here today for a second opinion.     2021 - presented with abdominal pain and BRBPR. She underwent diversion with ostomy placement.     10/4/21 - She underwent sigmoid colon resection, SCOOBY/BSO, liver resection (solitary 2.3cm lesion)    12/14/21-8/2022 - FOLFOX, Avastin s/p 12 cycles (D/C neuropathy)    11/22/2022 - 12/8/23 - 5fu and bevacizumab. Stable disease. Discontinued for patient preference.     12/8/23-4/1/24 - chemo holiday     4/9/24 - 8/20/23: FOLFIRI (8 cycles)     9/3/24 - FOLFIRI + bevacizumab     Ms. Tolbert is here today with her . She feels really well. Last FOLFIRI + [facility] was 1 month ago. During prior FOLFOX regimen 2021 to 2022, she reports having some tingling of finger tips and nausea. Once antiemetics adjusted, nausea was better.     She is planning to go on a [employer] cruise 10/17-10/28.      ROS:  10-pt ROS reviewed and negative except as mentioned above.     PMHx / FHx / PSHx: below was reviewed and is accurate  Past Medical History:   Diagnosis Date    GERD (gastroesophageal reflux disease)     HTN (hypertension)     Hypothyroidism     Stage IV carcinoma of colon (Multi)      SH - 10/2021 - sigmoid resection, SCOOBY/BSO, liver resection    FH - mother had cancer unknown type at age 71,  "maternal uncle - bowel cancer, age 52, maternal aunt - panc cancer at 50, maternal GM myeloma     Medications: below was reviewed and is accurate  No current outpatient medications on file.    Physical exam:  Vitals:    10/08/24 1131   BP: (!) 157/95   BP Location: Left arm   Patient Position: Sitting   BP Cuff Size: Adult   Pulse: 74   Temp: 36.7 °C (98.1 °F)   TempSrc: Temporal   SpO2: 98%   Weight: 79.2 kg (174 lb 8 oz)   Height: 1.56 m (5' 1.42\")     GEN: NAD, well-appearing  HEENT: AT/NC, EOMI intact.   LUNGS: normal respiratory effort, CTAB  EXT: No deformities or edema. B/L DP pulses 2+  NEURO: Grossly intact. No focal deficits.  HEME: No signs of easy bruising or active bleeding.  PSYCH: Appropriate mood and affect      ASSESSMENT AND PLAN:     Ms. Yaima Tolbert is 53 yo F with history of GERD, HTN, hypothyroidism, and metastatic colon cancer. She is here today for a second opinion.     I reviewed the imaging. Unfortunately, given number of metastatic sites that have grown, localized therapy with radiation and surgery are not options.     I reviewed potential systemic treatment options with her. She has not had oxaliplatin in over 2 years. I recommend trying FOLFOX again as previously there was no progressive disease while on this regimen. She did have side effects before with oxaliplatin, but she would like to try. I recommended trying at a lower dose. Could also give bevacizumab with FOLFOX as well for benefit from bevacizumab beyond progression and she's only had one treatment of this recently. Additionally, we discussed TAS-102 and bevacizumab though I favor trial of FOLFOX + bevacizumab first.     She is interested in hearing about clinical trials. I will refer her to phase I clinical trial team for screening. We do not have any phase II or III studies at this time.     Above discussed with referring physician, Dr. Hope.     My contact information was provided to Ms. Tolbert and her . I " encouraged them to reach out with any further questions.     Dayna Suggs MD  Staff, Gastrointestinal Medical Oncology  [facility]

## 2024-10-08 ENCOUNTER — OFFICE VISIT (OUTPATIENT)
Dept: HEMATOLOGY/ONCOLOGY | Facility: HOSPITAL | Age: 54
End: 2024-10-08
Payer: COMMERCIAL

## 2024-10-08 VITALS
SYSTOLIC BLOOD PRESSURE: 157 MMHG | BODY MASS INDEX: 32.94 KG/M2 | HEIGHT: 61 IN | TEMPERATURE: 98.1 F | WEIGHT: 174.5 LBS | OXYGEN SATURATION: 98 % | DIASTOLIC BLOOD PRESSURE: 95 MMHG | HEART RATE: 74 BPM

## 2024-10-08 DIAGNOSIS — C18.9 COLON CANCER (MULTI): ICD-10-CM

## 2024-10-08 DIAGNOSIS — C18.9 STAGE IV CARCINOMA OF COLON (MULTI): Primary | ICD-10-CM

## 2024-10-08 PROCEDURE — 99205 OFFICE O/P NEW HI 60 MIN: CPT | Performed by: STUDENT IN AN ORGANIZED HEALTH CARE EDUCATION/TRAINING PROGRAM

## 2024-10-08 PROCEDURE — 3008F BODY MASS INDEX DOCD: CPT | Performed by: STUDENT IN AN ORGANIZED HEALTH CARE EDUCATION/TRAINING PROGRAM

## 2024-10-08 PROCEDURE — 99215 OFFICE O/P EST HI 40 MIN: CPT | Performed by: STUDENT IN AN ORGANIZED HEALTH CARE EDUCATION/TRAINING PROGRAM

## 2024-10-08 ASSESSMENT — PAIN SCALES - GENERAL: PAINLEVEL: 0-NO PAIN

## 2024-10-08 NOTE — PROGRESS NOTES
Here today with  Moustapha Erwin states last treatment was a month ago. She is here today for 2nd opinion and to discuss any options including Clinical trials. She is feeling better since being off chemotherapy.

## 2024-12-06 ENCOUNTER — TELEPHONE (OUTPATIENT)
Dept: HEMATOLOGY/ONCOLOGY | Facility: HOSPITAL | Age: 54
End: 2024-12-06
Payer: COMMERCIAL

## 2024-12-06 ENCOUNTER — LAB (OUTPATIENT)
Dept: LAB | Facility: LAB | Age: 54
End: 2024-12-06
Payer: COMMERCIAL

## 2024-12-06 DIAGNOSIS — C18.9 STAGE IV CARCINOMA OF COLON (MULTI): Primary | ICD-10-CM

## 2024-12-06 DIAGNOSIS — C18.9 STAGE IV CARCINOMA OF COLON (MULTI): ICD-10-CM

## 2024-12-06 DIAGNOSIS — E03.9 HYPOTHYROIDISM, UNSPECIFIED TYPE: ICD-10-CM

## 2024-12-06 LAB
ALBUMIN SERPL BCP-MCNC: 4.3 G/DL (ref 3.4–5)
ALP SERPL-CCNC: 163 U/L (ref 33–110)
ALT SERPL W P-5'-P-CCNC: 17 U/L (ref 7–45)
ANION GAP SERPL CALC-SCNC: 12 MMOL/L (ref 10–20)
AST SERPL W P-5'-P-CCNC: 17 U/L (ref 9–39)
BASOPHILS # BLD AUTO: 0.03 X10*3/UL (ref 0–0.1)
BASOPHILS NFR BLD AUTO: 0.5 %
BILIRUB SERPL-MCNC: 0.4 MG/DL (ref 0–1.2)
BUN SERPL-MCNC: 10 MG/DL (ref 6–23)
CALCIUM SERPL-MCNC: 9.1 MG/DL (ref 8.6–10.3)
CHLORIDE SERPL-SCNC: 103 MMOL/L (ref 98–107)
CO2 SERPL-SCNC: 28 MMOL/L (ref 21–32)
CREAT SERPL-MCNC: 0.75 MG/DL (ref 0.5–1.05)
EGFRCR SERPLBLD CKD-EPI 2021: >90 ML/MIN/1.73M*2
EOSINOPHIL # BLD AUTO: 0.24 X10*3/UL (ref 0–0.7)
EOSINOPHIL NFR BLD AUTO: 3.9 %
ERYTHROCYTE [DISTWIDTH] IN BLOOD BY AUTOMATED COUNT: 14.9 % (ref 11.5–14.5)
GLUCOSE SERPL-MCNC: 85 MG/DL (ref 74–99)
HCT VFR BLD AUTO: 39.8 % (ref 36–46)
HGB BLD-MCNC: 12.6 G/DL (ref 12–16)
IMM GRANULOCYTES # BLD AUTO: 0.02 X10*3/UL (ref 0–0.7)
IMM GRANULOCYTES NFR BLD AUTO: 0.3 % (ref 0–0.9)
LYMPHOCYTES # BLD AUTO: 1.41 X10*3/UL (ref 1.2–4.8)
LYMPHOCYTES NFR BLD AUTO: 22.9 %
MCH RBC QN AUTO: 27.4 PG (ref 26–34)
MCHC RBC AUTO-ENTMCNC: 31.7 G/DL (ref 32–36)
MCV RBC AUTO: 87 FL (ref 80–100)
MONOCYTES # BLD AUTO: 0.39 X10*3/UL (ref 0.1–1)
MONOCYTES NFR BLD AUTO: 6.3 %
NEUTROPHILS # BLD AUTO: 4.06 X10*3/UL (ref 1.2–7.7)
NEUTROPHILS NFR BLD AUTO: 66.1 %
NRBC BLD-RTO: 0 /100 WBCS (ref 0–0)
PLATELET # BLD AUTO: 264 X10*3/UL (ref 150–450)
POTASSIUM SERPL-SCNC: 3.5 MMOL/L (ref 3.5–5.3)
PROT SERPL-MCNC: 6.9 G/DL (ref 6.4–8.2)
RBC # BLD AUTO: 4.6 X10*6/UL (ref 4–5.2)
SODIUM SERPL-SCNC: 139 MMOL/L (ref 136–145)
T3FREE SERPL-MCNC: 2.3 PG/ML (ref 2.3–4.2)
T4 FREE SERPL-MCNC: 0.69 NG/DL (ref 0.61–1.12)
TSH SERPL-ACNC: 37.09 MIU/L (ref 0.44–3.98)
WBC # BLD AUTO: 6.2 X10*3/UL (ref 4.4–11.3)

## 2024-12-06 PROCEDURE — 84481 FREE ASSAY (FT-3): CPT

## 2024-12-06 PROCEDURE — 84439 ASSAY OF FREE THYROXINE: CPT

## 2024-12-06 PROCEDURE — 85025 COMPLETE CBC W/AUTO DIFF WBC: CPT

## 2024-12-06 PROCEDURE — 80053 COMPREHEN METABOLIC PANEL: CPT

## 2024-12-06 PROCEDURE — 36415 COLL VENOUS BLD VENIPUNCTURE: CPT

## 2024-12-06 PROCEDURE — 84443 ASSAY THYROID STIM HORMONE: CPT

## 2024-12-06 NOTE — TELEPHONE ENCOUNTER
Called MsWesly Tomasz. Need thyroid labs and updated cbc, cmp to complete screening. Ordered. She will go to [facility] for blood draw today.    Dayna Suggs MD  Staff, Gastrointestinal Medical Oncology  [facility]

## 2024-12-09 DIAGNOSIS — Z00.6 RESEARCH EXAM: ICD-10-CM

## 2024-12-09 DIAGNOSIS — C18.9 STAGE IV CARCINOMA OF COLON (MULTI): ICD-10-CM

## 2024-12-10 ENCOUNTER — TELEPHONE (OUTPATIENT)
Dept: HEMATOLOGY/ONCOLOGY | Facility: HOSPITAL | Age: 54
End: 2024-12-10
Payer: COMMERCIAL

## 2024-12-30 ENCOUNTER — HOSPITAL ENCOUNTER (OUTPATIENT)
Dept: RESEARCH | Facility: HOSPITAL | Age: 54
Discharge: HOME | End: 2024-12-30
Payer: COMMERCIAL

## 2024-12-31 ENCOUNTER — HOSPITAL ENCOUNTER (OUTPATIENT)
Dept: RADIOLOGY | Facility: HOSPITAL | Age: 54
End: 2024-12-31
Payer: COMMERCIAL

## 2025-01-03 ENCOUNTER — APPOINTMENT (OUTPATIENT)
Dept: RADIOLOGY | Facility: HOSPITAL | Age: 55
End: 2025-01-03
Payer: COMMERCIAL

## 2025-01-07 ENCOUNTER — APPOINTMENT (OUTPATIENT)
Dept: HEMATOLOGY/ONCOLOGY | Facility: HOSPITAL | Age: 55
End: 2025-01-07
Payer: COMMERCIAL

## 2025-01-13 ENCOUNTER — OFFICE VISIT (OUTPATIENT)
Dept: URGENT CARE | Facility: CLINIC | Age: 55
End: 2025-01-13
Payer: COMMERCIAL

## 2025-01-13 VITALS
SYSTOLIC BLOOD PRESSURE: 111 MMHG | WEIGHT: 169.8 LBS | OXYGEN SATURATION: 96 % | DIASTOLIC BLOOD PRESSURE: 77 MMHG | TEMPERATURE: 98 F | HEART RATE: 92 BPM | BODY MASS INDEX: 31.65 KG/M2

## 2025-01-13 DIAGNOSIS — H81.12 BENIGN PAROXYSMAL POSITIONAL VERTIGO OF LEFT EAR: ICD-10-CM

## 2025-01-13 DIAGNOSIS — H66.93 ACUTE OTITIS MEDIA, BILATERAL: Primary | ICD-10-CM

## 2025-01-13 PROBLEM — R19.00 PELVIC MASS IN FEMALE: Status: ACTIVE | Noted: 2021-10-05

## 2025-01-13 PROBLEM — C18.9 MALIGNANT NEOPLASM OF COLON: Status: ACTIVE | Noted: 2021-10-04

## 2025-01-13 PROBLEM — D64.9 ANEMIA: Status: ACTIVE | Noted: 2021-09-27

## 2025-01-13 PROBLEM — N39.0 URINARY TRACT INFECTIOUS DISEASE: Status: ACTIVE | Noted: 2025-01-13

## 2025-01-13 PROBLEM — N83.209 OVARIAN CYST: Status: RESOLVED | Noted: 2021-08-06 | Resolved: 2025-01-13

## 2025-01-13 PROBLEM — Z90.710 STATUS POST HYSTERECTOMY: Status: ACTIVE | Noted: 2021-10-05

## 2025-01-13 PROBLEM — D62 ACUTE BLOOD LOSS AS CAUSE OF POSTOPERATIVE ANEMIA: Status: RESOLVED | Noted: 2021-10-05 | Resolved: 2025-01-13

## 2025-01-13 PROBLEM — I10 ESSENTIAL HYPERTENSION: Status: ACTIVE | Noted: 2021-05-13

## 2025-01-13 PROBLEM — Z93.2 ILEOSTOMY IN PLACE (MULTI): Status: ACTIVE | Noted: 2021-10-05

## 2025-01-13 PROBLEM — R10.9 ABDOMINAL PAIN: Status: RESOLVED | Noted: 2025-01-13 | Resolved: 2025-01-13

## 2025-01-13 PROBLEM — K21.9 GASTROESOPHAGEAL REFLUX DISEASE: Status: ACTIVE | Noted: 2021-09-27

## 2025-01-13 PROBLEM — E83.39 HYPOPHOSPHATEMIA: Status: ACTIVE | Noted: 2021-10-06

## 2025-01-13 PROBLEM — R10.9 LEFT FLANK PAIN: Status: RESOLVED | Noted: 2025-01-13 | Resolved: 2025-01-13

## 2025-01-13 PROBLEM — G89.18 POSTOPERATIVE PAIN: Status: ACTIVE | Noted: 2021-10-05

## 2025-01-13 PROBLEM — C78.7 MALIGNANT NEOPLASM METASTATIC TO LIVER (MULTI): Status: ACTIVE | Noted: 2021-10-05

## 2025-01-13 PROBLEM — K57.92 ACUTE DIVERTICULITIS: Status: RESOLVED | Noted: 2021-06-19 | Resolved: 2025-01-13

## 2025-01-13 PROBLEM — E87.6 HYPOKALEMIA: Status: ACTIVE | Noted: 2021-10-05

## 2025-01-13 PROBLEM — E03.9 HYPOTHYROIDISM: Status: ACTIVE | Noted: 2017-11-30

## 2025-01-13 PROCEDURE — 99203 OFFICE O/P NEW LOW 30 MIN: CPT

## 2025-01-13 PROCEDURE — 3078F DIAST BP <80 MM HG: CPT

## 2025-01-13 PROCEDURE — 3074F SYST BP LT 130 MM HG: CPT

## 2025-01-13 RX ORDER — AMLODIPINE BESYLATE 5 MG/1
5 TABLET ORAL
COMMUNITY
Start: 2024-03-25

## 2025-01-13 RX ORDER — SERTRALINE HYDROCHLORIDE 50 MG/1
1 TABLET, FILM COATED ORAL
COMMUNITY
Start: 2024-03-16

## 2025-01-13 RX ORDER — SERTRALINE HYDROCHLORIDE 100 MG/1
100 TABLET, FILM COATED ORAL DAILY
COMMUNITY

## 2025-01-13 RX ORDER — AZITHROMYCIN 250 MG/1
TABLET, FILM COATED ORAL
COMMUNITY
Start: 2024-03-13

## 2025-01-13 RX ORDER — OLANZAPINE 5 MG/1
5 TABLET ORAL
COMMUNITY
Start: 2024-06-04

## 2025-01-13 RX ORDER — ACETAMINOPHEN 500 MG
500 TABLET ORAL EVERY 8 HOURS PRN
COMMUNITY

## 2025-01-13 RX ORDER — OXYCODONE AND ACETAMINOPHEN 5; 325 MG/1; MG/1
TABLET ORAL EVERY 6 HOURS PRN
COMMUNITY

## 2025-01-13 RX ORDER — CALCIUM CITRATE/VITAMIN D3 200-3.125
1 TABLET ORAL
COMMUNITY

## 2025-01-13 RX ORDER — DOXYCYCLINE 100 MG/1
100 CAPSULE ORAL 2 TIMES DAILY
Qty: 14 CAPSULE | Refills: 0 | Status: SHIPPED | OUTPATIENT
Start: 2025-01-13 | End: 2025-01-20

## 2025-01-13 RX ORDER — MECLIZINE HCL 12.5 MG 12.5 MG/1
12.5 TABLET ORAL 3 TIMES DAILY PRN
Qty: 30 TABLET | Refills: 0 | Status: SHIPPED | OUTPATIENT
Start: 2025-01-13 | End: 2026-01-13

## 2025-01-13 RX ORDER — ESTRADIOL 0.1 MG/G
2 CREAM VAGINAL
COMMUNITY
Start: 2023-05-18

## 2025-01-13 RX ORDER — ALPHA LIPOIC ACID 300 MG
CAPSULE ORAL
COMMUNITY

## 2025-01-13 RX ORDER — LEVOTHYROXINE SODIUM 150 UG/1
1 TABLET ORAL DAILY
COMMUNITY

## 2025-01-13 RX ORDER — FLUOROURACIL 50 MG/ML
INJECTION, SOLUTION INTRAVENOUS
COMMUNITY

## 2025-01-13 RX ORDER — LIDOCAINE AND PRILOCAINE 25; 25 MG/G; MG/G
CREAM TOPICAL
COMMUNITY
Start: 2023-12-06

## 2025-01-13 RX ORDER — LOPERAMIDE HCL 2 MG
2 TABLET ORAL
COMMUNITY
Start: 2024-04-09

## 2025-01-13 RX ORDER — HYDROGEN PEROXIDE 3 %
20 SOLUTION, NON-ORAL MISCELLANEOUS DAILY
COMMUNITY
Start: 2024-11-15

## 2025-01-13 RX ORDER — ONDANSETRON HYDROCHLORIDE 8 MG/1
8 TABLET, FILM COATED ORAL EVERY 8 HOURS PRN
COMMUNITY
Start: 2024-04-29

## 2025-01-13 RX ORDER — TRAZODONE HYDROCHLORIDE 50 MG/1
TABLET ORAL
COMMUNITY
Start: 2024-07-15

## 2025-01-13 RX ORDER — METOPROLOL SUCCINATE 25 MG/1
25 TABLET, EXTENDED RELEASE ORAL NIGHTLY
COMMUNITY

## 2025-01-13 RX ORDER — DIPHENOXYLATE HYDROCHLORIDE AND ATROPINE SULFATE 2.5; .025 MG/1; MG/1
2 TABLET ORAL EVERY 6 HOURS PRN
COMMUNITY
Start: 2024-04-09

## 2025-01-13 RX ORDER — LORAZEPAM 0.5 MG/1
TABLET ORAL
COMMUNITY
Start: 2024-06-04

## 2025-01-13 ASSESSMENT — ENCOUNTER SYMPTOMS
HEADACHES: 1
CARDIOVASCULAR NEGATIVE: 1
ABDOMINAL PAIN: 0
EYE REDNESS: 0
CHILLS: 0
FACIAL SWELLING: 0
PALPITATIONS: 0
VOICE CHANGE: 0
DIARRHEA: 0
FATIGUE: 0
MYALGIAS: 0
GASTROINTESTINAL NEGATIVE: 1
WOUND: 0
SORE THROAT: 0
ARTHRALGIAS: 0
COLOR CHANGE: 0
RHINORRHEA: 0
NAUSEA: 0
VOMITING: 0
DIAPHORESIS: 0
COUGH: 0
WEAKNESS: 0
DIZZINESS: 1
MUSCULOSKELETAL NEGATIVE: 1
FEVER: 0
SHORTNESS OF BREATH: 0
LIGHT-HEADEDNESS: 0
CONSTITUTIONAL NEGATIVE: 1
TROUBLE SWALLOWING: 0
RESPIRATORY NEGATIVE: 1

## 2025-01-14 ASSESSMENT — VISUAL ACUITY: OU: 1

## 2025-01-14 NOTE — PROGRESS NOTES
Subjective   History  Yaima Tolbert is a 54 y.o. female who presents for Earache and Dizziness.    Patient reports right ear pain and dizziness worse with head movement for the last 2 days.      History provided by:  Patient and medical records  Earache   There is pain in the right ear. There has been no fever. Associated symptoms include headaches. Pertinent negatives include no abdominal pain, coughing, diarrhea, ear discharge, rash, rhinorrhea, sore throat or vomiting.   Dizziness  Quality:  Vertigo and room spinning  Onset quality:  Gradual  Timing:  Intermittent  Context: bending over, ear pain, head movement and standing up    Context: not with eye movement and not with loss of consciousness    Worsened by:  Lying down, standing up, sitting upright and turning head  Associated symptoms: headaches    Associated symptoms: no chest pain, no diarrhea, no nausea, no palpitations, no shortness of breath, no vomiting and no weakness    Risk factors: multiple medications    Risk factors: no new medications      No past surgical history on file.       Review of Systems   Review of Systems   Constitutional: Negative.  Negative for chills, diaphoresis, fatigue and fever.   HENT:  Positive for ear pain. Negative for congestion, ear discharge, facial swelling, rhinorrhea, sore throat, trouble swallowing and voice change.    Eyes:  Negative for redness.   Respiratory: Negative.  Negative for cough and shortness of breath.    Cardiovascular: Negative.  Negative for chest pain and palpitations.   Gastrointestinal: Negative.  Negative for abdominal pain, diarrhea, nausea and vomiting.   Musculoskeletal: Negative.  Negative for arthralgias and myalgias.   Skin: Negative.  Negative for color change, rash and wound.   Neurological:  Positive for dizziness and headaches. Negative for weakness and light-headedness.       Objective   Vital Signs  /77   Pulse 92   Temp 36.7 °C (98 °F)   Wt 77 kg (169 lb 12.8 oz)   LMP   (LMP Unknown)   SpO2 96%   BMI 31.65 kg/m²    All vitals have been reviewed and are stable.     Physical Exam  Physical Exam  Vitals and nursing note reviewed.   Constitutional:       General: She is awake. She is not in acute distress.     Appearance: Normal appearance. She is well-developed. She is not ill-appearing, toxic-appearing or diaphoretic.   HENT:      Head: Normocephalic and atraumatic. No right periorbital erythema or left periorbital erythema.      Jaw: There is normal jaw occlusion.      Right Ear: Ear canal and external ear normal. No swelling or tenderness. A middle ear effusion is present. Tympanic membrane is injected and erythematous. Tympanic membrane is not perforated or bulging.      Left Ear: Ear canal and external ear normal. No swelling or tenderness. A middle ear effusion is present. Tympanic membrane is injected. Tympanic membrane is not perforated, erythematous or bulging.      Nose: Nose normal. No congestion or rhinorrhea.      Mouth/Throat:      Lips: Pink. No lesions.      Mouth: Mucous membranes are moist. No oral lesions or angioedema.      Pharynx: Oropharynx is clear.   Eyes:      General: Lids are normal. Vision grossly intact. Gaze aligned appropriately.      Extraocular Movements: Extraocular movements intact.      Conjunctiva/sclera: Conjunctivae normal.      Right eye: Right conjunctiva is not injected.      Left eye: Left conjunctiva is not injected.      Pupils: Pupils are equal, round, and reactive to light.   Cardiovascular:      Rate and Rhythm: Normal rate and regular rhythm.   Pulmonary:      Effort: Pulmonary effort is normal. No tachypnea or respiratory distress.      Breath sounds: Normal air entry. No stridor. No wheezing.   Abdominal:      General: Abdomen is flat. There is no distension.      Palpations: Abdomen is soft.   Musculoskeletal:         General: No swelling or deformity. Normal range of motion.      Cervical back: Full passive range of motion  without pain, normal range of motion and neck supple.   Skin:     General: Skin is warm and dry.      Findings: No erythema or rash.   Neurological:      General: No focal deficit present.      Mental Status: She is alert and oriented to person, place, and time. Mental status is at baseline.      Cranial Nerves: Cranial nerves 2-12 are intact.      Sensory: Sensation is intact.      Motor: Motor function is intact.      Coordination: Coordination is intact.      Gait: Gait is intact.   Psychiatric:         Attention and Perception: Attention normal.         Mood and Affect: Mood and affect normal.         Speech: Speech normal.         Behavior: Behavior normal. Behavior is cooperative.         Thought Content: Thought content normal.         Judgment: Judgment normal.         Diagnostic Results   No results found for this or any previous visit (from the past 48 hours).    Assessment/Plan   Procedures   N/A    Problem List Items Addressed This Visit    None  Visit Diagnoses       Acute otitis media, bilateral    -  Primary    Relevant Medications    doxycycline (Monodox) 100 mg capsule    Benign paroxysmal positional vertigo of left ear        Relevant Medications    meclizine (Antivert) 12.5 mg tablet            UC Course  Patient disposition: Home    Red flags for reporting to ER have been reviewed with the patient.    Current diagnosis, any medication changes, and all in-office lab or radiologic results have been reviewed with the patient at the time of the visit.   If symptoms do not improve or worsen, patient is to follow up with PCP or report to the emergency room.   Patient is alert and oriented x3 and non-toxic appearing. Vital signs are stable.   Patient and/or guardian has sufficient decision-making capabilities at this time and reports understanding and agreement with the treatment plan made through shared decision-making.

## 2025-01-14 NOTE — PATIENT INSTRUCTIONS
ACUTE OTITIS MEDIA     -DOXYCYLINE 7 DAYS (antibiotic) has been prescribed for the treatment of infection behind the ear drum     - Ibuprofen and/or Acetaminophen may be used every 4-6 hours for pain, inflammation, and fever reduction as needed   - Hydrogen Peroxide or OTC Debrox drops may be used regularly to loosen cerumen (ear wax) to promote self cleaning    - Rubbing Alcohol and/or Distilled White Vinegar (Acetic Acid) may be used in ears after swimming to dry out water and prevent infection of the ear canal     - It is common to feel that ears still feel clogged a few days after completing treatment as fluid may remain behind the ear drum after the infection is cleared.  If symptoms do not improve or get worse in 3-4 days follow-up with PCP as additional treatment may be required.     DIZZINESS      - MECLIZINE (Bonine) has been prescribed for dizziness     - Recommend use of Benadryl before bed for rest and symptom relief    Be sure to drink adequate fluids to keep hydrated, continue medications as ordered, report worsening symptoms to your primary care provider    Have someone stay with you until you feel stable. Do not drive, operate machinery, or play sports until your caregiver says it is okay.   Keep all follow-up appointments as directed by your caregiver.     Lie down right away if you start feeling like you might faint. Breathe deeply and steadily. Wait until all the symptoms have passed.  If you are taking blood pressure or heart medicine, get up slowly, taking several minutes to sit and then stand. This can reduce dizziness.     SEEK IMMEDIATE MEDICAL CARE IF: You have a severe headache. You have unusual pain in the chest, abdomen, or back. You are bleeding from the mouth or rectum, or you have a black or tarry stool. You have an irregular or very fast heartbeat. You have pain with breathing. You have repeated fainting or seizure-like jerking during an episode. You faint when sitting or lying down.  You have confusion. You have difficulty walking. You have severe weakness. You have vision problems. If you fainted, call your local emergency services - do not drive yourself to the hospital.     Antibiotics fight against and reduce all bacteria in your body.  Yeast infections, abdominal pain, and diarrhea are very common side effects of taking antibiotics, as the good bacteria is destroyed. Taking supplemental probiotics or eating probiotic yogurt (Activia, Chobani, Kefir) can help replace the bacteria and restore balance preventing or relieving these side effects.     ** It is very important that you complete the whole prescription of antibiotics to kill all of the bacteria causing illness even if symptoms improve. If any of the targeted bacteria survives and returns to cause infection, it will then be stronger and more resistant to antibiotic medications in the future.

## 2025-03-20 ENCOUNTER — OFFICE VISIT (OUTPATIENT)
Dept: URGENT CARE | Facility: CLINIC | Age: 55
End: 2025-03-20
Payer: COMMERCIAL

## 2025-03-20 VITALS
DIASTOLIC BLOOD PRESSURE: 76 MMHG | OXYGEN SATURATION: 96 % | WEIGHT: 158 LBS | TEMPERATURE: 98.5 F | BODY MASS INDEX: 29.45 KG/M2 | SYSTOLIC BLOOD PRESSURE: 114 MMHG | RESPIRATION RATE: 16 BRPM | HEART RATE: 74 BPM

## 2025-03-20 DIAGNOSIS — H66.003 ACUTE SUPPURATIVE OTITIS MEDIA OF BOTH EARS WITHOUT SPONTANEOUS RUPTURE OF TYMPANIC MEMBRANES, RECURRENCE NOT SPECIFIED: Primary | ICD-10-CM

## 2025-03-20 PROCEDURE — 3074F SYST BP LT 130 MM HG: CPT | Performed by: PHYSICIAN ASSISTANT

## 2025-03-20 PROCEDURE — 3078F DIAST BP <80 MM HG: CPT | Performed by: PHYSICIAN ASSISTANT

## 2025-03-20 PROCEDURE — 99213 OFFICE O/P EST LOW 20 MIN: CPT | Performed by: PHYSICIAN ASSISTANT

## 2025-03-20 RX ORDER — AMLODIPINE BESYLATE 10 MG/1
1 TABLET ORAL
COMMUNITY
Start: 2025-03-14

## 2025-03-20 RX ORDER — CEFDINIR 300 MG/1
300 CAPSULE ORAL 2 TIMES DAILY
Qty: 20 CAPSULE | Refills: 0 | Status: SHIPPED | OUTPATIENT
Start: 2025-03-20 | End: 2025-03-30

## 2025-03-20 RX ORDER — TRAZODONE HYDROCHLORIDE 100 MG/1
100 TABLET ORAL NIGHTLY
COMMUNITY
Start: 2025-01-20

## 2025-03-20 RX ORDER — OXYCODONE HYDROCHLORIDE 5 MG/1
TABLET ORAL
COMMUNITY

## 2025-03-20 RX ORDER — TRIFLURIDINE AND TIPIRACIL 20; 8.19 MG/1; MG/1
TABLET, FILM COATED ORAL
COMMUNITY
Start: 2025-03-03

## 2025-03-20 RX ORDER — FLUTICASONE PROPIONATE 50 MCG
1 SPRAY, SUSPENSION (ML) NASAL DAILY
Qty: 16 G | Refills: 0 | Status: SHIPPED | OUTPATIENT
Start: 2025-03-20 | End: 2026-03-20

## 2025-03-20 NOTE — PROGRESS NOTES
Subjective   Patient ID: Yaima Tolbert is a 54 y.o. female who presents for Earache.    HPI     Bilateral ear pain x 3 days. States that she has  Patient is currently on chemo secondary to metastatic colon CA. She reports that she does have an ENT, just got tubes out of her ears around 4 years ago, so does have an extensive history. She was given doxycycline at that time. She has not been using any OTC drops. The left ear feels worse than the right. Was bending down picking up sticks, then started hurting. Put heat on it, tried Tylenol and has been gradually getting worse - particularly worse when she moves her ear. Denies: fever, chills, headache, dizziness, CP, SOB, abdominal pain, N/V/D, rash, swelling, bruising, sore throat, cough, congestion.     Review of Systems   All other systems reviewed and are negative.      Objective   /76   Pulse 74   Temp 36.9 °C (98.5 °F)   Resp 16   Wt 71.7 kg (158 lb)   SpO2 96%   BMI 29.45 kg/m²     Physical Exam  Vitals reviewed.   Constitutional:       General: She is awake.      Appearance: Normal appearance. She is well-developed.   HENT:      Head: Normocephalic and atraumatic.      Right Ear: Ear canal normal. A middle ear effusion is present. Tympanic membrane is injected, erythematous and bulging.      Left Ear: Ear canal normal. A middle ear effusion is present. Tympanic membrane is injected, erythematous and bulging.   Cardiovascular:      Rate and Rhythm: Normal rate.   Pulmonary:      Effort: Pulmonary effort is normal.   Musculoskeletal:      Cervical back: Full passive range of motion without pain.      Right lower leg: No edema.      Left lower leg: No edema.   Skin:     General: Skin is warm and dry.      Findings: No lesion or rash.   Neurological:      General: No focal deficit present.      Mental Status: She is alert and oriented to person, place, and time.      Cranial Nerves: No facial asymmetry.      Motor: Motor function is intact.      Gait:  Gait is intact.   Psychiatric:         Attention and Perception: Attention normal.         Mood and Affect: Mood and affect normal.         Assessment/Plan   Problem List Items Addressed This Visit    None  Visit Diagnoses         Codes    Acute suppurative otitis media of both ears without spontaneous rupture of tympanic membranes, recurrence not specified    -  Primary H66.003    Relevant Medications    cefdinir (Omnicef) 300 mg capsule    fluticasone (Flonase) 50 mcg/actuation nasal spray          OTITIS MEDIA, BILATERAL    - Increase rest and fluids   - Flonase Rx given to help with middle ear effusion - reports that this has helped before, Cefdinir Rx given for ear infection; pt reports that her PCN allergy is one that was reported by her mother, she is not completely certain of the allergy. Is not a life-threatening allergy. She was advised to take as prescribed.   - If any fever that will not go away with tylenol/ibuprofen or if you have blood or discharge out of the ear, please report to ER  - If no improvement, please follow up with a PCP. If you need a referral to one, please call our office.    Patient education provided to patient and documented in AVS. Red flag symptoms reviewed with patient and all questions answered. Patient or parent/guardian verbalized understanding and agreement with care plan as above. All in office testing reviewed with patient. If symptoms worsen or do not improve, patient is to follow up with PCP or report to the ER.

## 2025-04-09 ENCOUNTER — OFFICE VISIT (OUTPATIENT)
Dept: URGENT CARE | Facility: CLINIC | Age: 55
End: 2025-04-09
Payer: COMMERCIAL

## 2025-04-09 VITALS
DIASTOLIC BLOOD PRESSURE: 84 MMHG | BODY MASS INDEX: 28.48 KG/M2 | TEMPERATURE: 97.7 F | OXYGEN SATURATION: 99 % | WEIGHT: 152.8 LBS | SYSTOLIC BLOOD PRESSURE: 120 MMHG | HEART RATE: 80 BPM

## 2025-04-09 DIAGNOSIS — J20.9 ACUTE BRONCHITIS, UNSPECIFIED ORGANISM: ICD-10-CM

## 2025-04-09 DIAGNOSIS — H65.05 RECURRENT ACUTE SEROUS OTITIS MEDIA OF LEFT EAR: Primary | ICD-10-CM

## 2025-04-09 PROCEDURE — 3079F DIAST BP 80-89 MM HG: CPT | Performed by: PHYSICIAN ASSISTANT

## 2025-04-09 PROCEDURE — 99214 OFFICE O/P EST MOD 30 MIN: CPT | Performed by: PHYSICIAN ASSISTANT

## 2025-04-09 PROCEDURE — 3074F SYST BP LT 130 MM HG: CPT | Performed by: PHYSICIAN ASSISTANT

## 2025-04-09 RX ORDER — BENZONATATE 200 MG/1
200 CAPSULE ORAL 3 TIMES DAILY PRN
Qty: 21 CAPSULE | Refills: 0 | Status: SHIPPED | OUTPATIENT
Start: 2025-04-09 | End: 2025-04-16

## 2025-04-09 RX ORDER — FLUTICASONE PROPIONATE 50 MCG
1 SPRAY, SUSPENSION (ML) NASAL DAILY
Qty: 16 G | Refills: 0 | Status: SHIPPED | OUTPATIENT
Start: 2025-04-09 | End: 2026-04-09

## 2025-04-09 RX ORDER — PREDNISONE 5 MG/1
TABLET ORAL
Qty: 15 TABLET | Refills: 0 | Status: SHIPPED | OUTPATIENT
Start: 2025-04-09 | End: 2025-04-16

## 2025-04-09 RX ORDER — CLARITHROMYCIN 500 MG/1
1000 TABLET, FILM COATED, EXTENDED RELEASE ORAL DAILY
Qty: 20 TABLET | Refills: 0 | Status: SHIPPED | OUTPATIENT
Start: 2025-04-09 | End: 2025-04-11

## 2025-04-09 NOTE — PROGRESS NOTES
Subjective   Patient ID: Yaima Tolbert is a 54 y.o. female.    Patient presents with cough, chest congestion, clear sputum, SOB with laying, worse with activity. Pt states that he saw her palliative care physician, and he told her to come in and be re-checked. She has chemotherapy on Monday 4/14/25, but she is likely not going to get it. She is not running a fever. She is having a lot of sinus pressure/congestion and it is running down her throat and into her chest. She has been using nasal spray, Mucinex-DM. She has been using Tylenol. Took 1 Sudafed when the sinus pressure was so bad, but she did not take it again due to palpitations and Hx of high blood pressure. Her chest is hurting from coughing so much, and she is sometimes throwing up from coughing so much. She is having a dry, barky cough constantly for the       History provided by:  Patient      Review of Systems   All other systems reviewed and are negative.      Objective     Physical Exam  Vitals reviewed.   Constitutional:       General: She is awake.      Appearance: Normal appearance. She is well-developed.   HENT:      Head: Normocephalic and atraumatic.      Right Ear: Tympanic membrane and ear canal normal.      Left Ear: Ear canal normal. A middle ear effusion is present. Tympanic membrane is bulging.      Ears:      Comments: Serous fluid behind L TM.     Nose: Nose normal.      Mouth/Throat:      Lips: Pink.      Mouth: Mucous membranes are moist.      Pharynx: Oropharynx is clear.   Cardiovascular:      Rate and Rhythm: Normal rate and regular rhythm.   Pulmonary:      Effort: Pulmonary effort is normal.      Breath sounds: Decreased air movement present. Decreased breath sounds and wheezing (diffuse, end-expiratory) present. No rhonchi or rales.      Comments: Nearly constant, dry, harsh cough noted throughout exam.   Musculoskeletal:      Cervical back: Full passive range of motion without pain.      Right lower leg: No edema.      Left  lower leg: No edema.   Skin:     General: Skin is warm and dry.      Findings: No lesion or rash.   Neurological:      General: No focal deficit present.      Mental Status: She is alert and oriented to person, place, and time.      Cranial Nerves: No facial asymmetry.      Motor: Motor function is intact.      Gait: Gait is intact.   Psychiatric:         Attention and Perception: Attention normal.         Mood and Affect: Mood and affect normal.         Assessment/Plan   Problem List Items Addressed This Visit    None  Visit Diagnoses         Codes    Recurrent acute serous otitis media of left ear    -  Primary H65.05    Relevant Medications    predniSONE (Deltasone) 5 mg tablet    fluticasone (Flonase) 50 mcg/actuation nasal spray    Other Relevant Orders    Referral to ENT    Acute bronchitis, unspecified organism     J20.9    Relevant Medications    benzonatate (Tessalon) 200 mg capsule    clarithromycin XL (Biaxin XL) 500 mg 24 hr tablet          Medications Rx as above  Discussed steroids as an immunosuppressant with chemo use - pt expressed understanding, does not believe that she will be getting her round of chemo on 4/14/25  Requesting new referral to ENT - she reports that her other ENT was in [place] and she hasn't seen them in 4-5 years, likely no longer established. Referral given to pt with [facility] phone number.   Follow up with PCP as needed.     Red flag symptoms reviewed with patient and all questions answered. Patient or parent/guardian verbalized understanding and agreement with care plan as above. All in office testing reviewed with patient. If symptoms worsen or do not improve, patient is to follow up with PCP or report to the ER.    Patient disposition: Home

## 2025-04-11 ENCOUNTER — TELEPHONE (OUTPATIENT)
Dept: URGENT CARE | Facility: CLINIC | Age: 55
End: 2025-04-11
Payer: COMMERCIAL

## 2025-04-11 DIAGNOSIS — H65.05 RECURRENT ACUTE SEROUS OTITIS MEDIA OF LEFT EAR: Primary | ICD-10-CM

## 2025-04-11 RX ORDER — CLARITHROMYCIN 250 MG/5ML
500 FOR SUSPENSION ORAL 2 TIMES DAILY
Qty: 200 ML | Refills: 0 | Status: SHIPPED | OUTPATIENT
Start: 2025-04-11 | End: 2025-04-21